# Patient Record
Sex: MALE | Race: OTHER | NOT HISPANIC OR LATINO
[De-identification: names, ages, dates, MRNs, and addresses within clinical notes are randomized per-mention and may not be internally consistent; named-entity substitution may affect disease eponyms.]

---

## 2018-07-03 ENCOUNTER — APPOINTMENT (OUTPATIENT)
Dept: INTERNAL MEDICINE | Facility: CLINIC | Age: 48
End: 2018-07-03
Payer: COMMERCIAL

## 2018-07-03 VITALS
DIASTOLIC BLOOD PRESSURE: 90 MMHG | OXYGEN SATURATION: 96 % | TEMPERATURE: 98.4 F | HEART RATE: 100 BPM | WEIGHT: 315 LBS | SYSTOLIC BLOOD PRESSURE: 120 MMHG

## 2018-07-03 DIAGNOSIS — Z82.49 FAMILY HISTORY OF ISCHEMIC HEART DISEASE AND OTHER DISEASES OF THE CIRCULATORY SYSTEM: ICD-10-CM

## 2018-07-03 PROCEDURE — 99386 PREV VISIT NEW AGE 40-64: CPT

## 2018-07-03 RX ORDER — MULTIVITAMIN
TABLET ORAL
Refills: 0 | Status: ACTIVE | COMMUNITY

## 2018-07-03 RX ORDER — COLD-HOT PACK
125 MCG EACH MISCELLANEOUS
Refills: 0 | Status: ACTIVE | COMMUNITY

## 2018-07-03 RX ORDER — OMEGA-3/DHA/EPA/FISH OIL 300-1000MG
1000 CAPSULE ORAL DAILY
Qty: 100 | Refills: 3 | Status: ACTIVE | COMMUNITY

## 2018-07-03 RX ORDER — UBIDECARENONE 200 MG
200 CAPSULE ORAL
Refills: 0 | Status: ACTIVE | COMMUNITY

## 2018-07-03 RX ORDER — LORAZEPAM 1 MG/1
1 TABLET ORAL
Qty: 30 | Refills: 0 | Status: ACTIVE | COMMUNITY
Start: 2018-07-03 | End: 1900-01-01

## 2018-07-03 RX ORDER — DULOXETINE HYDROCHLORIDE 60 MG/1
60 CAPSULE, DELAYED RELEASE ORAL TWICE DAILY
Refills: 0 | Status: ACTIVE | COMMUNITY

## 2018-07-03 RX ORDER — ASPIRIN 81 MG
81 TABLET, DELAYED RELEASE (ENTERIC COATED) ORAL
Refills: 0 | Status: ACTIVE | COMMUNITY

## 2018-07-03 NOTE — ASSESSMENT
[FreeTextEntry1] : \par 48 year old man with obesity, ESME, anxiety presents to establish care.\par \par 1. Obesity: check A1c, lipids, TSH; referred to weight management Dr. Banerjee/Dr. Goldstein for assistance, pt is motivated\par \par 2. Anxiety: renewed Lorazepam, checked iSTOP, no issues; only takes it as needed, 30 pills would last several months\par \par 3. ESME: cont f/u with sleep medicine; using CPAP\par \par 4. HCM: labs with physical today; praised pt's tobacco-free lifestyle; due for colonoscopy at 49 next year; prostate exam benign in office today, check PSA with labs

## 2018-07-03 NOTE — HISTORY OF PRESENT ILLNESS
[FreeTextEntry1] : establish care [de-identified] : Presents to establish care. Has hx anxiety, sees psychiatry and therapist and is compliant on medications. Feels well today. Also with hx asthma, on Dulera and proair as needed. Denies CP, SOB, wheezing today. He admits to struggling to lose weight, and is interested in meeting with weight management team for help.

## 2018-07-03 NOTE — PHYSICAL EXAM

## 2018-07-13 ENCOUNTER — MEDICATION RENEWAL (OUTPATIENT)
Age: 48
End: 2018-07-13

## 2018-07-13 LAB
ALBUMIN SERPL ELPH-MCNC: 4.3 G/DL
ALP BLD-CCNC: 107 U/L
ALT SERPL-CCNC: 40 U/L
ANION GAP SERPL CALC-SCNC: 15 MMOL/L
AST SERPL-CCNC: 32 U/L
BASOPHILS # BLD AUTO: 0.02 K/UL
BASOPHILS NFR BLD AUTO: 0.5 %
BILIRUB SERPL-MCNC: 0.6 MG/DL
BUN SERPL-MCNC: 13 MG/DL
CALCIUM SERPL-MCNC: 9.4 MG/DL
CHLORIDE SERPL-SCNC: 103 MMOL/L
CHOLEST SERPL-MCNC: 177 MG/DL
CHOLEST/HDLC SERPL: 6.3 RATIO
CO2 SERPL-SCNC: 25 MMOL/L
CREAT SERPL-MCNC: 0.82 MG/DL
EOSINOPHIL # BLD AUTO: 0.19 K/UL
EOSINOPHIL NFR BLD AUTO: 4.5 %
GLUCOSE SERPL-MCNC: 102 MG/DL
HBA1C MFR BLD HPLC: 5.4 %
HCT VFR BLD CALC: 42.4 %
HDLC SERPL-MCNC: 28 MG/DL
HGB BLD-MCNC: 15.3 G/DL
HIV1+2 AB SPEC QL IA.RAPID: NONREACTIVE
IMM GRANULOCYTES NFR BLD AUTO: 0.5 %
LDLC SERPL CALC-MCNC: NORMAL
LYMPHOCYTES # BLD AUTO: 1.39 K/UL
LYMPHOCYTES NFR BLD AUTO: 33.2 %
MAN DIFF?: NORMAL
MCHC RBC-ENTMCNC: 34.1 PG
MCHC RBC-ENTMCNC: 36.1 GM/DL
MCV RBC AUTO: 94.4 FL
MONOCYTES # BLD AUTO: 0.33 K/UL
MONOCYTES NFR BLD AUTO: 7.9 %
NEUTROPHILS # BLD AUTO: 2.24 K/UL
NEUTROPHILS NFR BLD AUTO: 53.4 %
PLATELET # BLD AUTO: 217 K/UL
POTASSIUM SERPL-SCNC: 4.4 MMOL/L
PROT SERPL-MCNC: 7.1 G/DL
PSA SERPL-MCNC: 0.72 NG/ML
RBC # BLD: 4.49 M/UL
RBC # FLD: 13 %
SODIUM SERPL-SCNC: 143 MMOL/L
TESTOST BND SERPL-MCNC: 6.2 PG/ML
TESTOST SERPL-MCNC: 290.7 NG/DL
TRIGL SERPL-MCNC: 423 MG/DL
TSH SERPL-ACNC: 3.74 UIU/ML
WBC # FLD AUTO: 4.19 K/UL

## 2018-07-17 ENCOUNTER — TRANSCRIPTION ENCOUNTER (OUTPATIENT)
Age: 48
End: 2018-07-17

## 2018-08-01 ENCOUNTER — APPOINTMENT (OUTPATIENT)
Dept: BARIATRICS/WEIGHT MGMT | Facility: CLINIC | Age: 48
End: 2018-08-01
Payer: COMMERCIAL

## 2018-08-01 VITALS
SYSTOLIC BLOOD PRESSURE: 124 MMHG | HEIGHT: 67.5 IN | BODY MASS INDEX: 48.86 KG/M2 | HEART RATE: 102 BPM | DIASTOLIC BLOOD PRESSURE: 90 MMHG | WEIGHT: 315 LBS | OXYGEN SATURATION: 94 %

## 2018-08-01 DIAGNOSIS — Z82.49 FAMILY HISTORY OF ISCHEMIC HEART DISEASE AND OTHER DISEASES OF THE CIRCULATORY SYSTEM: ICD-10-CM

## 2018-08-01 PROCEDURE — 99215 OFFICE O/P EST HI 40 MIN: CPT

## 2018-08-01 PROCEDURE — 99205 OFFICE O/P NEW HI 60 MIN: CPT

## 2018-08-22 ENCOUNTER — APPOINTMENT (OUTPATIENT)
Dept: BARIATRICS/WEIGHT MGMT | Facility: CLINIC | Age: 48
End: 2018-08-22

## 2018-10-03 ENCOUNTER — OUTPATIENT (OUTPATIENT)
Dept: OUTPATIENT SERVICES | Facility: HOSPITAL | Age: 48
LOS: 1 days | Discharge: STILL ADMITTED OR ADMINISTRATIVE | End: 2018-10-03

## 2018-10-04 DIAGNOSIS — F32.9 MAJOR DEPRESSIVE DISORDER, SINGLE EPISODE, UNSPECIFIED: ICD-10-CM

## 2018-11-13 ENCOUNTER — MEDICATION RENEWAL (OUTPATIENT)
Age: 48
End: 2018-11-13

## 2018-12-05 ENCOUNTER — APPOINTMENT (OUTPATIENT)
Dept: INTERNAL MEDICINE | Facility: CLINIC | Age: 48
End: 2018-12-05
Payer: COMMERCIAL

## 2018-12-05 VITALS
HEIGHT: 67.5 IN | OXYGEN SATURATION: 94 % | DIASTOLIC BLOOD PRESSURE: 104 MMHG | SYSTOLIC BLOOD PRESSURE: 160 MMHG | TEMPERATURE: 98.8 F | HEART RATE: 91 BPM | BODY MASS INDEX: 48.86 KG/M2 | WEIGHT: 315 LBS

## 2018-12-05 VITALS — DIASTOLIC BLOOD PRESSURE: 88 MMHG | SYSTOLIC BLOOD PRESSURE: 130 MMHG

## 2018-12-05 PROCEDURE — 90686 IIV4 VACC NO PRSV 0.5 ML IM: CPT

## 2018-12-05 PROCEDURE — G0008: CPT

## 2018-12-05 PROCEDURE — 99214 OFFICE O/P EST MOD 30 MIN: CPT | Mod: 25

## 2018-12-05 NOTE — ASSESSMENT
[FreeTextEntry1] : Pt 's BP was ?increased initially, seems better w thigh cuff but was diff to hear. He has machine at home and will check it over next few dd. Does cook w salt, will try to avoid.\par Wt loss blayne discussed, he will rejoin WW and start gradual exercising.\par Flu shot given.\par He will let  us know how is BP is doing.

## 2018-12-05 NOTE — HISTORY OF PRESENT ILLNESS
[FreeTextEntry1] : f/u and flu shot [de-identified] : 49 yo man w morbid obesity and asthma, anxiety/panic /depression.\lexie Sees Pulm Dr. Armstrong for his asthma, well-controlled\lexie saw Obesity med for his wt and didn't like the plan. He liked [employer] in the past and might be willing to resume. Hasn't been excising but plans to soon.\lexie Sees Joelle NUGENT and therapist and currently doing well- he says in Sept he d/cd his Cymbalta and was almost suicidal, went to [facility] ER but not admitted. Back on his meds and doing much better.\lexie

## 2019-07-10 ENCOUNTER — APPOINTMENT (OUTPATIENT)
Dept: INTERNAL MEDICINE | Facility: CLINIC | Age: 49
End: 2019-07-10
Payer: COMMERCIAL

## 2019-07-10 VITALS
HEIGHT: 67.5 IN | TEMPERATURE: 98.9 F | SYSTOLIC BLOOD PRESSURE: 152 MMHG | OXYGEN SATURATION: 97 % | DIASTOLIC BLOOD PRESSURE: 70 MMHG | BODY MASS INDEX: 48.86 KG/M2 | HEART RATE: 112 BPM | WEIGHT: 315 LBS

## 2019-07-10 DIAGNOSIS — R03.0 ELEVATED BLOOD-PRESSURE READING, W/OUT DIAGNOSIS OF HYPERTENSION: ICD-10-CM

## 2019-07-10 DIAGNOSIS — E66.9 OBESITY, UNSPECIFIED: ICD-10-CM

## 2019-07-10 PROCEDURE — G0447 BEHAVIOR COUNSEL OBESITY 15M: CPT

## 2019-07-10 PROCEDURE — 99396 PREV VISIT EST AGE 40-64: CPT

## 2019-07-10 PROCEDURE — G0444 DEPRESSION SCREEN ANNUAL: CPT

## 2019-07-10 PROCEDURE — 99215 OFFICE O/P EST HI 40 MIN: CPT | Mod: 25

## 2019-07-10 PROCEDURE — G0442 ANNUAL ALCOHOL SCREEN 15 MIN: CPT

## 2019-07-11 PROBLEM — R03.0 ELEVATED BP WITHOUT DIAGNOSIS OF HYPERTENSION: Status: RESOLVED | Noted: 2018-12-05 | Resolved: 2019-07-11

## 2019-07-11 LAB — HEMOCCULT STL QL IA: NEGATIVE

## 2019-07-11 NOTE — PHYSICAL EXAM
[No Acute Distress] : no acute distress [Well Nourished] : well nourished [Well Developed] : well developed [Well-Appearing] : well-appearing [Normal Sclera/Conjunctiva] : normal sclera/conjunctiva [PERRL] : pupils equal round and reactive to light [EOMI] : extraocular movements intact [Normal Outer Ear/Nose] : the outer ears and nose were normal in appearance [Normal Oropharynx] : the oropharynx was normal [No JVD] : no jugular venous distention [No Lymphadenopathy] : no lymphadenopathy [Supple] : supple [Thyroid Normal, No Nodules] : the thyroid was normal and there were no nodules present [No Respiratory Distress] : no respiratory distress  [No Accessory Muscle Use] : no accessory muscle use [Clear to Auscultation] : lungs were clear to auscultation bilaterally [Normal Rate] : normal rate  [Regular Rhythm] : with a regular rhythm [Normal S1, S2] : normal S1 and S2 [No Murmur] : no murmur heard [No Carotid Bruits] : no carotid bruits [No Abdominal Bruit] : a ~M bruit was not heard ~T in the abdomen [No Varicosities] : no varicosities [Pedal Pulses Present] : the pedal pulses are present [No Edema] : there was no peripheral edema [No Palpable Aorta] : no palpable aorta [Soft] : abdomen soft [No Extremity Clubbing/Cyanosis] : no extremity clubbing/cyanosis [Non-distended] : non-distended [Non Tender] : non-tender [No HSM] : no HSM [No Masses] : no abdominal mass palpated [Normal Bowel Sounds] : normal bowel sounds [Prostate Enlargement] : the prostate was not enlarged [Prostate Tenderness] : the prostate was not tender [No Prostate Nodules] : no prostate nodules [Normal Posterior Cervical Nodes] : no posterior cervical lymphadenopathy [Normal Anterior Cervical Nodes] : no anterior cervical lymphadenopathy [No CVA Tenderness] : no CVA  tenderness [No Spinal Tenderness] : no spinal tenderness [No Joint Swelling] : no joint swelling [Grossly Normal Strength/Tone] : grossly normal strength/tone [No Rash] : no rash [Coordination Grossly Intact] : coordination grossly intact [No Focal Deficits] : no focal deficits [Normal Gait] : normal gait [Deep Tendon Reflexes (DTR)] : deep tendon reflexes were 2+ and symmetric [Normal Affect] : the affect was normal [Normal Insight/Judgement] : insight and judgment were intact

## 2019-07-11 NOTE — ASSESSMENT
[FreeTextEntry1] : \par Preventive visit: pt is up to date with vaccines including Tdap; he is due for colon cancer screening, referral given; prostate cancer screening done today with GABINO and PSA testing; praised pt's tobacco-free lifestyle; encouraged use of smoke/CO detectors in the house and use of seatbelts when in vehicles\par \par Medical Issue 1: Anxiety: unstable and worsening with difficulty controlling symptoms off medication; this was complicated by visit to mental health hospital since self-d/c'ing medication which led to suicidal thoughts; the thoughts have since resolved and he restarted care with his psychiatrist along with medication; coordinated follow-up care with psych\par \par Medical Issue 2: Asthma: unstable and variably controlled with episodes of SOB occurring intermittently but usually associated with anxiety; prescribed inhalers and coordinated follow-up care with pulmonologist\par \par Medical Issue 3: Eczema: unstable with persistently dry skin that is itchy and bothersome; advised hydration with Aloe gel and alternating with steroid cream as anti-inflammatory agent; coordinated follow-up care with dermatology\par \par Medical Issue 4: Morbid obesity counselling: 15 mins, assessed BMI at 50 and above now in morbidly obese range; advised weight loss, exercise routine and diet; pt agreed to start exercise program for target weight loss 20 lbs; assisted patient with resources including nutrition referral as needed and arranged for follow-up to monitor weight loss progress over next several months\par \par Medical Issue 5: ESME: stable and controlled with CPAP machine; continue current plan of care and coordinated follow-up care with sleep medicine team for titration of CPAP settings\par \par Depression screen performed today, PHQ2=0\par \par Annual alcohol misuse screen, 15 mins, done; negative

## 2019-07-11 NOTE — HEALTH RISK ASSESSMENT
[Good] : ~his/her~  mood as  good [] : No [No] : No [No falls in past year] : Patient reported no falls in the past year [Change in mental status noted] : No change in mental status noted [Language] : denies difficulty with language [Behavior] : denies difficulty with behavior [Learning/Retaining New Information] : denies difficulty learning/retaining new information [Handling Complex Tasks] : denies difficulty handling complex tasks [Reasoning] : denies difficulty with reasoning [None] : None [Spatial Ability and Orientation] : denies difficulty with spatial ability and orientation [Alone] : lives alone [Employed] : employed [Single] : single [Sexually Active] : not sexually active [High Risk Behavior] : no high risk behavior [Feels Safe at Home] : Feels safe at home [Fully functional (bathing, dressing, toileting, transferring, walking, feeding)] : Fully functional (bathing, dressing, toileting, transferring, walking, feeding) [Reports changes in hearing] : Reports no changes in hearing [Fully functional (using the telephone, shopping, preparing meals, housekeeping, doing laundry, using] : Fully functional and needs no help or supervision to perform IADLs (using the telephone, shopping, preparing meals, housekeeping, doing laundry, using transportation, managing medications and managing finances) [Reports changes in vision] : Reports no changes in vision [Reports changes in dental health] : Reports no changes in dental health [Reports normal functional visual acuity (ie: able to read med bottle)] : Reports normal functional visual acuity [Carbon Monoxide Detector] : carbon monoxide detector [Smoke Detector] : smoke detector [Safety elements used in home] : safety elements used in home [Guns at Home] : no guns at home [Seat Belt] :  uses seat belt [Sunscreen] : uses sunscreen [Travel to Developing Areas] : does not  travel to developing areas [TB Exposure] : is not being exposed to tuberculosis [Caregiver Concerns] : does not have caregiver concerns [Reviewed no changes] : Reviewed no changes [AdvancecareDate] : 07/19

## 2019-07-11 NOTE — HISTORY OF PRESENT ILLNESS
[FreeTextEntry1] : Presents for preventive visit as well as concerns regarding anxiety, asthma, eczema, morbid obesity and ESME. [de-identified] : \par Preventive visit: pt is up to date with vaccines including Tdap; he is due for colon cancer screening; he is due for prostate cancer screening with GABINO and PSA testing; he does not smoke cigarettes and does not misuse alcohol; he feels safe at home and has smoke/CO detectors in the house; he wears seatbelts when in vehicles\par \par Medical Issue 1: Anxiety: unstable and worsening with difficulty controlling symptoms off medication; this was complicated by visit to mental health hospital since self-d/c'ing medication which led to suicidal thoughts; the thoughts have since resolved and he restarted care with his psychiatrist along with medication; he is looking for new psychologist and would like resources to help find one\par \par Medical Issue 2: Asthma: unstable and variably controlled with episodes of SOB occurring intermittently but usually associated with anxiety; he has inhalers which he uses as needed; he denies nighttime awakenings and chest pain; he follows with a pulmonologist as well\par \par Medical Issue 3: Eczema: unstable with persistently dry skin that is itchy and bothersome; he would like second opinion with new dermatologist and needs help with resources to set this up\par \par Medical Issue 4: Morbid obesity: unstable and worsening with increase in BMI and body weight; he has no diet and no exercise routine yet but he did join a gym and is motivated to start using it regularly; he denies chest pain with exertion and shortness of breath; he would like help with resources to achieve his weight loss goals\par \par Medical Issue 5: ESME: stable and controlled with CPAP machine; he lives alone so does not have anyone to tell him if he snores loudly or gasps for air overnight while sleeping; he reports no daytime somnolence and he wakes up feeling refreshed in the mornings

## 2019-07-12 LAB
ALBUMIN SERPL ELPH-MCNC: 4.5 G/DL
ALP BLD-CCNC: 106 U/L
ALT SERPL-CCNC: 41 U/L
ANION GAP SERPL CALC-SCNC: 14 MMOL/L
AST SERPL-CCNC: 34 U/L
BASOPHILS # BLD AUTO: 0.03 K/UL
BASOPHILS NFR BLD AUTO: 0.5 %
BILIRUB SERPL-MCNC: 0.5 MG/DL
BUN SERPL-MCNC: 15 MG/DL
CALCIUM SERPL-MCNC: 9.6 MG/DL
CHLORIDE SERPL-SCNC: 103 MMOL/L
CHOLEST SERPL-MCNC: 189 MG/DL
CHOLEST/HDLC SERPL: 7 RATIO
CO2 SERPL-SCNC: 27 MMOL/L
CREAT SERPL-MCNC: 0.98 MG/DL
EOSINOPHIL # BLD AUTO: 0.19 K/UL
EOSINOPHIL NFR BLD AUTO: 3.2 %
ESTIMATED AVERAGE GLUCOSE: 108 MG/DL
GLUCOSE SERPL-MCNC: 89 MG/DL
HBA1C MFR BLD HPLC: 5.4 %
HCT VFR BLD CALC: 50.9 %
HDLC SERPL-MCNC: 27 MG/DL
HGB BLD-MCNC: 16.4 G/DL
HIV1+2 AB SPEC QL IA.RAPID: NONREACTIVE
IMM GRANULOCYTES NFR BLD AUTO: 0.7 %
LDLC SERPL CALC-MCNC: NORMAL MG/DL
LYMPHOCYTES # BLD AUTO: 1.92 K/UL
LYMPHOCYTES NFR BLD AUTO: 32.1 %
MAN DIFF?: NORMAL
MCHC RBC-ENTMCNC: 31.1 PG
MCHC RBC-ENTMCNC: 32.2 GM/DL
MCV RBC AUTO: 96.6 FL
MONOCYTES # BLD AUTO: 0.6 K/UL
MONOCYTES NFR BLD AUTO: 10 %
NEUTROPHILS # BLD AUTO: 3.21 K/UL
NEUTROPHILS NFR BLD AUTO: 53.5 %
PLATELET # BLD AUTO: 226 K/UL
POTASSIUM SERPL-SCNC: 4 MMOL/L
PROT SERPL-MCNC: 7.3 G/DL
PSA SERPL-MCNC: 0.95 NG/ML
RBC # BLD: 5.27 M/UL
RBC # FLD: 12.9 %
SODIUM SERPL-SCNC: 144 MMOL/L
TRIGL SERPL-MCNC: 556 MG/DL
TSH SERPL-ACNC: 3.43 UIU/ML
WBC # FLD AUTO: 5.99 K/UL

## 2019-07-19 ENCOUNTER — TRANSCRIPTION ENCOUNTER (OUTPATIENT)
Age: 49
End: 2019-07-19

## 2019-07-22 ENCOUNTER — OTHER (OUTPATIENT)
Age: 49
End: 2019-07-22

## 2019-07-22 ENCOUNTER — APPOINTMENT (OUTPATIENT)
Dept: OTOLARYNGOLOGY | Facility: CLINIC | Age: 49
End: 2019-07-22
Payer: COMMERCIAL

## 2019-07-22 VITALS
DIASTOLIC BLOOD PRESSURE: 104 MMHG | HEART RATE: 89 BPM | WEIGHT: 315 LBS | BODY MASS INDEX: 47.74 KG/M2 | SYSTOLIC BLOOD PRESSURE: 166 MMHG | HEIGHT: 68 IN

## 2019-07-22 DIAGNOSIS — H90.3 SENSORINEURAL HEARING LOSS, BILATERAL: ICD-10-CM

## 2019-07-22 DIAGNOSIS — H69.83 OTHER SPECIFIED DISORDERS OF EUSTACHIAN TUBE, BILATERAL: ICD-10-CM

## 2019-07-22 PROCEDURE — 99204 OFFICE O/P NEW MOD 45 MIN: CPT | Mod: 25

## 2019-07-22 PROCEDURE — 31231 NASAL ENDOSCOPY DX: CPT

## 2019-07-22 PROCEDURE — 95004 PERQ TESTS W/ALRGNC XTRCS: CPT

## 2019-07-22 PROCEDURE — 92557 COMPREHENSIVE HEARING TEST: CPT

## 2019-07-22 PROCEDURE — 92567 TYMPANOMETRY: CPT

## 2019-07-22 RX ORDER — FEXOFENADINE HYDROCHLORIDE 180 MG/1
180 TABLET ORAL
Qty: 1 | Refills: 0 | Status: ACTIVE | COMMUNITY
Start: 2019-07-22 | End: 1900-01-01

## 2019-07-22 RX ORDER — FLUTICASONE PROPIONATE 50 UG/1
50 SPRAY, METERED NASAL DAILY
Qty: 1 | Refills: 2 | Status: ACTIVE | COMMUNITY
Start: 2019-07-22 | End: 1900-01-01

## 2019-07-22 NOTE — ASSESSMENT
[FreeTextEntry1] : Patient is  fifth greatest feels that his hearing is diminished used to been abandoned here for evaluation complains of some stuffiness on occasion endoscopically is no tumors masses or polyps being eustachian tube orifices audiogram was perfectly normal allergy testing was done we did put him on Flonase and Allegraafter coming positive to multiple and shins worst dust and CAT hair. Patient was educated will followup with usnasal spray for ongoing eustachian tube dysfunction reassured him that his hearing is perfectly normal no followup with us as needed.

## 2019-07-22 NOTE — REVIEW OF SYSTEMS
[Hearing Loss] : hearing loss [Snoring With Pauses] : snoring with pauses [Negative] : Endocrine [de-identified] : ear popping

## 2019-07-22 NOTE — CONSULT LETTER
[Dear  ___] : Dear  [unfilled], [Consult Letter:] : I had the pleasure of evaluating your patient, [unfilled]. [Please see my note below.] : Please see my note below. [Consult Closing:] : Thank you very much for allowing me to participate in the care of this patient.  If you have any questions, please do not hesitate to contact me. [Sincerely,] : Sincerely, [FreeTextEntry3] : Ajay Roe MD\par [facility]\par Otolaryngology and Facial Plastics\par Associated Professor, [employer]\par

## 2019-07-22 NOTE — HISTORY OF PRESENT ILLNESS
[de-identified] : Patient is having minor changes in hearing over the last year or so that is progressive and minor. He cannot tell if it is him or if it is children but when they speak he cannot hear them as well and it sounds like mumbling. He does admit to turning the volume up on the TV a lot. He also admits that he has been popping his ears a lot frequently by swallowing excessively. He shelton snot have any ear pain or issues with dizziness. He does not have any nasal congestion or runny nose. He does have ESME as well. 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

## 2019-07-29 ENCOUNTER — APPOINTMENT (OUTPATIENT)
Dept: GASTROENTEROLOGY | Facility: CLINIC | Age: 49
End: 2019-07-29
Payer: COMMERCIAL

## 2019-07-29 VITALS
SYSTOLIC BLOOD PRESSURE: 143 MMHG | DIASTOLIC BLOOD PRESSURE: 96 MMHG | HEIGHT: 68 IN | HEART RATE: 92 BPM | WEIGHT: 315 LBS | BODY MASS INDEX: 47.74 KG/M2

## 2019-07-29 DIAGNOSIS — F41.0 PANIC DISORDER [EPISODIC PAROXYSMAL ANXIETY]: ICD-10-CM

## 2019-07-29 DIAGNOSIS — Z78.9 OTHER SPECIFIED HEALTH STATUS: ICD-10-CM

## 2019-07-29 PROCEDURE — 99205 OFFICE O/P NEW HI 60 MIN: CPT

## 2019-07-29 PROCEDURE — 82274 ASSAY TEST FOR BLOOD FECAL: CPT | Mod: QW

## 2019-07-29 RX ORDER — LORAZEPAM 1 MG/1
1 TABLET ORAL
Refills: 0 | Status: DISCONTINUED | COMMUNITY
End: 2019-07-29

## 2019-07-29 NOTE — HISTORY OF PRESENT ILLNESS
[FreeTextEntry1] : This 49-year-old gentleman is referred for evaluation prior to scheduling a screening colonoscopy. He had a colonoscopy and upper endoscopy about 7 years ago during a workup for severe anxiety and panic attacks. Reportedly, both of the procedures were negative. He denies change in bowel habits, abdominal pain, blood in the stool, nausea, vomiting or heartburn. There is no family history of colon polyps or colon cancer. A maternal aunt had breast cancer. Recent blood testing reveals significant hypertriglyceridemia. He had a ventral hernia repair approximately 5 years ago. In , he had a tonsillectomy and deviated septum repair as treatment for his sleep apnea. He uses a CPAP machine. He has asthma, for which he takes Dulera and ProAir. He takes Cymbalta for anxiety, depression and panic attacks. He has eczema. He was treated for hypertension about 15 years ago, but only took medication for a short time. His mother  at 82 secondary to Alzheimer's disease. His father  at 78 secondary to a heart condition. He has 4 brothers, oral healthy. He is single. He works as a . She never smoked. He drinks alcohol rarely. He has allergies to weeds, trees, animal fur and shellfish.

## 2019-07-29 NOTE — ASSESSMENT
[FreeTextEntry1] : 1. Occult positive stool for blood-upper versus lower GI bleeding lesion-rule out polyp, malignancy, peptic ulcer, gastritis, hemorrhoids.\par 2. Mild obesity.\par 3. Anxiety, depression and history of panic attacks.\par 4. Asthma.\par 5. Sleep apnea.\par 6. Hypertriglyceridemia. \par 7. Status post ventral hernia repair.\par 8. Status post tonsillectomy and deviated septum repair.\par \par Plan:\par 1. The patient was advised to schedule an upper endoscopy and a colonoscopy. Both procedures should be done in the hospital as an outpatient. Both procedures, material risks, benefits and alternatives were discussed with the patient at length. Brochures given. Preadmission testing will be necessary.\par 2. The patient was advised to contact his PCP, Dr. Crenshaw, for a treatment of hypertriglyceridemia.\par 3. The patient was given the [facility] telephone line so that he can be connected with a nutritionist. He is a rare of the urgency of this appointment because of his morbid obesity.

## 2019-07-29 NOTE — PHYSICAL EXAM
[General Appearance - Well Developed] : well developed [General Appearance - Alert] : alert [General Appearance - In No Acute Distress] : in no acute distress [Sclera] : the sclera and conjunctiva were normal [Extraocular Movements] : extraocular movements were intact [PERRL With Normal Accommodation] : pupils were equal in size, round, and reactive to light [Retina] : no retinal hemorrhages, vessel changes or exudates seen on fundoscopic exam [Optic Disc Abnormality] : the optic disc were normal in size and color [Strabismus] : no strabismus was seen [Examination Of The Oral Cavity] : the lips and gums were normal [Outer Ear] : the ears and nose were normal in appearance [Both Tympanic Membranes Were Examined] : both tympanic membranes were normal [Oropharynx] : the oropharynx was normal [Nasal Cavity] : the nasal mucosa and septum were normal [Neck Appearance] : the appearance of the neck was normal [Thyroid Diffuse Enlargement] : the thyroid was not enlarged [Jugular Venous Distention Increased] : there was no jugular-venous distention [Neck Cervical Mass (___cm)] : no neck mass was observed [Thyroid Nodule] : there were no palpable thyroid nodules [Heart Rate And Rhythm] : heart rate was normal and rhythm regular [Lungs Percussion] : the lungs were normal to percussion [Auscultation Breath Sounds / Voice Sounds] : lungs were clear to auscultation bilaterally [Heart Sounds] : normal S1 and S2 [Heart Sounds Gallop] : no gallops [Murmurs] : no murmurs [Arterial Pulses Carotid] : carotid pulses were normal with no bruits [Heart Sounds Pericardial Friction Rub] : no pericardial rub [Abdominal Aorta] : the abdominal aorta was normal [Full Pulse] : the pedal pulses are present [Edema] : there was no peripheral edema [Veins - Varicosity Changes] : there were no varicosital changes [Bowel Sounds] : normal bowel sounds [Abdomen Soft] : soft [Abdomen Tenderness] : non-tender [Abdomen Mass (___ Cm)] : no abdominal mass palpated [Abdomen Hernia] : no hernia was discovered [Normal Sphincter Tone] : normal sphincter tone [No Rectal Mass] : no rectal mass [Occult Blood Positive] : stool positive for occult blood [Scrotum] : the scrotum was normal [Penis Abnormality] : the penis was normal [Testes Swelling] : the testicles were not swollen [Testes Mass (___cm)] : there were no testicular masses [Cervical Lymph Nodes Enlarged Posterior Bilaterally] : posterior cervical [FreeTextEntry1] : could not palpate the prostate [Cervical Lymph Nodes Enlarged Anterior Bilaterally] : anterior cervical [Supraclavicular Lymph Nodes Enlarged Bilaterally] : supraclavicular [Axillary Lymph Nodes Enlarged Bilaterally] : axillary [Inguinal Lymph Nodes Enlarged Bilaterally] : inguinal [Femoral Lymph Nodes Enlarged Bilaterally] : femoral [No Spinal Tenderness] : no spinal tenderness [No CVA Tenderness] : no ~M costovertebral angle tenderness [Nail Clubbing] : no clubbing  or cyanosis of the fingernails [Abnormal Walk] : normal gait [Musculoskeletal - Swelling] : no joint swelling seen [Motor Tone] : muscle strength and tone were normal [Involuntary Movements] : no involuntary movements were seen [Skin Color & Pigmentation] : normal skin color and pigmentation [Skin Turgor] : normal skin turgor [] : no rash [No Focal Deficits] : no focal deficits [Skin Lesions] : no skin lesions [Oriented To Time, Place, And Person] : oriented to person, place, and time [Impaired Insight] : insight and judgment were intact [Mood] : the mood was normal [Affect] : the affect was normal

## 2019-07-31 ENCOUNTER — TRANSCRIPTION ENCOUNTER (OUTPATIENT)
Age: 49
End: 2019-07-31

## 2019-08-01 ENCOUNTER — OUTPATIENT (OUTPATIENT)
Dept: OUTPATIENT SERVICES | Facility: HOSPITAL | Age: 49
LOS: 1 days | End: 2019-08-01
Payer: COMMERCIAL

## 2019-08-01 VITALS
WEIGHT: 315 LBS | HEIGHT: 70 IN | RESPIRATION RATE: 16 BRPM | DIASTOLIC BLOOD PRESSURE: 100 MMHG | TEMPERATURE: 98 F | SYSTOLIC BLOOD PRESSURE: 140 MMHG | HEART RATE: 84 BPM | OXYGEN SATURATION: 97 %

## 2019-08-01 DIAGNOSIS — Z98.890 OTHER SPECIFIED POSTPROCEDURAL STATES: Chronic | ICD-10-CM

## 2019-08-01 DIAGNOSIS — Z90.89 ACQUIRED ABSENCE OF OTHER ORGANS: Chronic | ICD-10-CM

## 2019-08-01 DIAGNOSIS — R19.5 OTHER FECAL ABNORMALITIES: ICD-10-CM

## 2019-08-01 LAB
ANION GAP SERPL CALC-SCNC: 13 MMO/L — SIGNIFICANT CHANGE UP (ref 7–14)
BUN SERPL-MCNC: 13 MG/DL — SIGNIFICANT CHANGE UP (ref 7–23)
CALCIUM SERPL-MCNC: 9.8 MG/DL — SIGNIFICANT CHANGE UP (ref 8.4–10.5)
CHLORIDE SERPL-SCNC: 101 MMOL/L — SIGNIFICANT CHANGE UP (ref 98–107)
CO2 SERPL-SCNC: 27 MMOL/L — SIGNIFICANT CHANGE UP (ref 22–31)
CREAT SERPL-MCNC: 0.86 MG/DL — SIGNIFICANT CHANGE UP (ref 0.5–1.3)
GLUCOSE SERPL-MCNC: 83 MG/DL — SIGNIFICANT CHANGE UP (ref 70–99)
HCT VFR BLD CALC: 48 % — SIGNIFICANT CHANGE UP (ref 39–50)
HGB BLD-MCNC: 16.5 G/DL — SIGNIFICANT CHANGE UP (ref 13–17)
MCHC RBC-ENTMCNC: 31.2 PG — SIGNIFICANT CHANGE UP (ref 27–34)
MCHC RBC-ENTMCNC: 34.4 % — SIGNIFICANT CHANGE UP (ref 32–36)
MCV RBC AUTO: 90.7 FL — SIGNIFICANT CHANGE UP (ref 80–100)
NRBC # FLD: 0 K/UL — SIGNIFICANT CHANGE UP (ref 0–0)
PLATELET # BLD AUTO: 229 K/UL — SIGNIFICANT CHANGE UP (ref 150–400)
PMV BLD: 10.5 FL — SIGNIFICANT CHANGE UP (ref 7–13)
POTASSIUM SERPL-MCNC: 4 MMOL/L — SIGNIFICANT CHANGE UP (ref 3.5–5.3)
POTASSIUM SERPL-SCNC: 4 MMOL/L — SIGNIFICANT CHANGE UP (ref 3.5–5.3)
RBC # BLD: 5.29 M/UL — SIGNIFICANT CHANGE UP (ref 4.2–5.8)
RBC # FLD: 12.2 % — SIGNIFICANT CHANGE UP (ref 10.3–14.5)
SODIUM SERPL-SCNC: 141 MMOL/L — SIGNIFICANT CHANGE UP (ref 135–145)
WBC # BLD: 5.67 K/UL — SIGNIFICANT CHANGE UP (ref 3.8–10.5)
WBC # FLD AUTO: 5.67 K/UL — SIGNIFICANT CHANGE UP (ref 3.8–10.5)

## 2019-08-01 PROCEDURE — 93010 ELECTROCARDIOGRAM REPORT: CPT

## 2019-08-01 NOTE — H&P PST ADULT - NEGATIVE ENMT SYMPTOMS
no hearing difficulty/no ear pain/no vertigo/no throat pain/no tinnitus/no sinus symptoms/no dysphagia

## 2019-08-01 NOTE — H&P PST ADULT - NEGATIVE NEUROLOGICAL SYMPTOMS
no transient paralysis/no weakness/no generalized seizures/no focal seizures/no paresthesias/no syncope

## 2019-08-01 NOTE — H&P PST ADULT - NSICDXPASTSURGICALHX_GEN_ALL_CORE_FT
PAST SURGICAL HISTORY:  H/O colonoscopy 2011    H/O hernia repair mesh 2014    History of tonsillectomy

## 2019-08-01 NOTE — H&P PST ADULT - HISTORY OF PRESENT ILLNESS
Pt is a 49 yr old male scheduled for EGD and Colonoscopy under Anesthesia with dr Gutierrez 8/5/19. Pt to have routine screening colonoscopy but due to BMI and Sleep apnea to be done in [facility]. Pt denies GI symptoms or pain but states MD noted positive hemocult stool test.

## 2019-08-01 NOTE — H&P PST ADULT - ANESTHESIA, PREVIOUS REACTION, PROFILE
Pt states he was told/not sure not sure/Pt states he was told after hernia surgery in 2014 at [facility] that they needed 3 anesthesiologists to intubate him - does not know when surgery was or with what surgeon . "Sometime in summer of 2014"

## 2019-08-01 NOTE — H&P PST ADULT - NSICDXPROBLEM_GEN_ALL_CORE_FT
PROBLEM DIAGNOSES  Problem: Other fecal abnormalities  Assessment and Plan: Pt scheduled for surgery and preop instructions including instructions for taking Famotidine on the day of surgery, given verbally and with use of  written materials, and patient confirming understanding of such instructions using  teach back method.  Pt had BP of 140/100 in PST and told to call PCP in am and have evaluated again - MC given 7/10/19 but new MC needed for BP - pt agreed.   OR Booking notified of Sleep apnea, BMI 56 and hx of difficult intubation

## 2019-08-05 ENCOUNTER — APPOINTMENT (OUTPATIENT)
Dept: GASTROENTEROLOGY | Facility: CLINIC | Age: 49
End: 2019-08-05

## 2019-08-05 ENCOUNTER — APPOINTMENT (OUTPATIENT)
Dept: GASTROENTEROLOGY | Facility: HOSPITAL | Age: 49
End: 2019-08-05

## 2019-08-06 ENCOUNTER — APPOINTMENT (OUTPATIENT)
Dept: INTERNAL MEDICINE | Facility: CLINIC | Age: 49
End: 2019-08-06
Payer: COMMERCIAL

## 2019-08-06 VITALS
TEMPERATURE: 98.8 F | HEIGHT: 68 IN | OXYGEN SATURATION: 96 % | SYSTOLIC BLOOD PRESSURE: 140 MMHG | HEART RATE: 104 BPM | BODY MASS INDEX: 47.74 KG/M2 | WEIGHT: 315 LBS | DIASTOLIC BLOOD PRESSURE: 80 MMHG

## 2019-08-06 PROBLEM — J45.909 UNSPECIFIED ASTHMA, UNCOMPLICATED: Chronic | Status: ACTIVE | Noted: 2019-08-01

## 2019-08-06 PROBLEM — F32.9 MAJOR DEPRESSIVE DISORDER, SINGLE EPISODE, UNSPECIFIED: Chronic | Status: ACTIVE | Noted: 2019-08-01

## 2019-08-06 PROBLEM — G47.30 SLEEP APNEA, UNSPECIFIED: Chronic | Status: ACTIVE | Noted: 2019-08-01

## 2019-08-06 PROBLEM — E66.9 OBESITY, UNSPECIFIED: Chronic | Status: ACTIVE | Noted: 2019-08-01

## 2019-08-06 PROCEDURE — 99215 OFFICE O/P EST HI 40 MIN: CPT

## 2019-08-06 PROCEDURE — G0447 BEHAVIOR COUNSEL OBESITY 15M: CPT

## 2019-08-07 ENCOUNTER — NON-APPOINTMENT (OUTPATIENT)
Age: 49
End: 2019-08-07

## 2019-08-07 ENCOUNTER — APPOINTMENT (OUTPATIENT)
Dept: OPHTHALMOLOGY | Facility: CLINIC | Age: 49
End: 2019-08-07
Payer: COMMERCIAL

## 2019-08-07 PROCEDURE — 92015 DETERMINE REFRACTIVE STATE: CPT

## 2019-08-07 PROCEDURE — 92004 COMPRE OPH EXAM NEW PT 1/>: CPT

## 2019-08-07 NOTE — ASSESSMENT
[FreeTextEntry1] : \par Medical Issue 1: Anxiety: unstable and worsening with difficulty controlling symptoms off medication; this was complicated by visit to mental health hospital since self-d/c'ing medication which led to suicidal thoughts; the thoughts have since resolved and he restarted care with his psychiatrist along with medication; coordinated follow-up care with psych\par \par Medical Issue 2: Asthma: unstable and variably controlled with episodes of SOB occurring intermittently but usually associated with anxiety; prescribed inhalers and coordinated follow-up care with pulmonologist\par \par Medical Issue 3: Eczema: unstable with persistently dry skin that is itchy and bothersome; advised hydration with Aloe gel and alternating with steroid cream as anti-inflammatory agent; coordinated follow-up care with dermatology\[facility] \par Medical Issue 4: Morbid obesity counselling: 15 mins, assessed BMI at 50 and above now in morbidly obese range; advised weight loss, exercise routine and diet; pt agreed to start exercise program for target weight loss 20 lbs; assisted patient with resources including nutrition referral as needed and arranged for follow-up to monitor weight loss progress over next several months\par \par Medical Issue 5: ESME: stable and controlled with CPAP machine; continue current plan of care and coordinated follow-up care with sleep medicine team for titration of CPAP settings\par \par Medical Issue 6: Hypertriglyceridemia: unstable and poorly controlled; rec starting Gemfibrozil over next 3 mos if trigs remain elevated despite his lifestyle change efforts

## 2019-08-07 NOTE — HISTORY OF PRESENT ILLNESS
[FreeTextEntry1] : Presents with concerns regarding anxiety, asthma, eczema, morbid obesity, ESME and hypertriglyceridemia. [de-identified] : \par Medical Issue 1: Anxiety: unstable and worsening with difficulty controlling symptoms off medication; this was complicated by visit to mental health hospital since self-d/c'ing medication which led to suicidal thoughts; the thoughts have since resolved and he restarted care with his psychiatrist along with medication; he is looking for new psychologist and would like resources to help find one\par \par Medical Issue 2: Asthma: unstable and variably controlled with episodes of SOB occurring intermittently but usually associated with anxiety; he has inhalers which he uses as needed; he denies nighttime awakenings and chest pain; he follows with a pulmonologist as well\par \par Medical Issue 3: Eczema: unstable with persistently dry skin that is itchy and bothersome; he would like second opinion with new dermatologist and needs help with resources to set this up\par \par Medical Issue 4: Morbid obesity: unstable and worsening with increase in BMI and body weight; he has no diet and no exercise routine yet but he did join a gym and is motivated to start using it regularly; he denies chest pain with exertion and shortness of breath; he would like help with resources to achieve his weight loss goals\par \par Medical Issue 5: ESME: stable and controlled with CPAP machine; he lives alone so does not have anyone to tell him if he snores loudly or gasps for air overnight while sleeping; he reports no daytime somnolence and he wakes up feeling refreshed in the mornings\par \par Medical Issue 6: Hypertriglyceridemia: unstable and poorly controlled; not currently on medication; he is trying to modify his diet and work on exercise routine for weight loss; he denies chest pains, palpitations and shortness of breath

## 2019-08-08 ENCOUNTER — OTHER (OUTPATIENT)
Age: 49
End: 2019-08-08

## 2019-09-17 ENCOUNTER — RX RENEWAL (OUTPATIENT)
Age: 49
End: 2019-09-17

## 2019-10-09 ENCOUNTER — APPOINTMENT (OUTPATIENT)
Dept: INTERNAL MEDICINE | Facility: CLINIC | Age: 49
End: 2019-10-09
Payer: COMMERCIAL

## 2019-10-09 VITALS
SYSTOLIC BLOOD PRESSURE: 140 MMHG | HEIGHT: 68 IN | DIASTOLIC BLOOD PRESSURE: 80 MMHG | BODY MASS INDEX: 47.74 KG/M2 | TEMPERATURE: 98.6 F | HEART RATE: 89 BPM | WEIGHT: 315 LBS | OXYGEN SATURATION: 98 %

## 2019-10-09 PROCEDURE — G0447 BEHAVIOR COUNSEL OBESITY 15M: CPT

## 2019-10-09 PROCEDURE — 90686 IIV4 VACC NO PRSV 0.5 ML IM: CPT

## 2019-10-09 PROCEDURE — G0008: CPT

## 2019-10-09 PROCEDURE — 99215 OFFICE O/P EST HI 40 MIN: CPT | Mod: 25

## 2019-10-09 NOTE — ASSESSMENT
[FreeTextEntry1] : \par Medical Issue 1: Anxiety: unstable and worsening with difficulty controlling symptoms off medication; this was complicated by visit to mental health hospital since self-d/c'ing medication which led to suicidal thoughts; the thoughts have since resolved and he restarted care with his psychiatrist along with medication; coordinated follow-up care with psych\par \par Medical Issue 2: Asthma: unstable and variably controlled with episodes of SOB occurring intermittently but usually associated with anxiety; prescribed inhalers and coordinated follow-up care with pulmonologist\par \par Medical Issue 3: Eczema: unstable with persistently dry skin that is itchy and bothersome; advised hydration with Aloe gel and alternating with steroid cream as anti-inflammatory agent; coordinated follow-up care with dermatology\[facility] \par Medical Issue 4: Morbid obesity counselling: 15 mins, assessed BMI at 50 and above now in morbidly obese range; advised weight loss, exercise routine and diet; pt agreed to start exercise program for target weight loss 20 lbs; assisted patient with resources including nutrition referral as needed and arranged for follow-up to monitor weight loss progress over next several months\par \par Medical Issue 5: ESME: stable and controlled with CPAP machine; continue current plan of care and coordinated follow-up care with sleep medicine team for titration of CPAP settings\par \par Medical Issue 6: Hypertriglyceridemia: unstable and poorly controlled; rec starting Gemfibrozil over next 3 mos if trigs remain elevated despite his lifestyle change efforts

## 2019-10-09 NOTE — HISTORY OF PRESENT ILLNESS
[FreeTextEntry1] : Presents with concerns regarding anxiety, asthma, eczema, morbid obesity, ESME and hypertriglyceridemia. [de-identified] : \par Medical Issue 1: Anxiety: unstable and worsening with difficulty controlling symptoms off medication; this was complicated by visit to mental health hospital since self-d/c'ing medication which led to suicidal thoughts; the thoughts have since resolved and he restarted care with his psychiatrist along with medication; he is looking for new psychologist and would like resources to help find one\par \par Medical Issue 2: Asthma: unstable and variably controlled with episodes of SOB occurring intermittently but usually associated with anxiety; he has inhalers which he uses as needed; he denies nighttime awakenings and chest pain; he follows with a pulmonologist as well\par \par Medical Issue 3: Eczema: unstable with persistently dry skin that is itchy and bothersome; he would like second opinion with new dermatologist and needs help with resources to set this up\par \par Medical Issue 4: Morbid obesity: unstable and worsening with increase in BMI and body weight; he has no diet and no exercise routine yet but he did join a gym and is motivated to start using it regularly; he denies chest pain with exertion and shortness of breath; he would like help with resources to achieve his weight loss goals\par \par Medical Issue 5: ESME: stable and controlled with CPAP machine; he lives alone so does not have anyone to tell him if he snores loudly or gasps for air overnight while sleeping; he reports no daytime somnolence and he wakes up feeling refreshed in the mornings\par \par Medical Issue 6: Hypertriglyceridemia: unstable and poorly controlled; not currently on medication; he is trying to modify his diet and work on exercise routine for weight loss; he denies chest pains, palpitations and shortness of breath

## 2019-10-09 NOTE — PHYSICAL EXAM
[No Acute Distress] : no acute distress [Well Developed] : well developed [Well Nourished] : well nourished [PERRL] : pupils equal round and reactive to light [Normal Sclera/Conjunctiva] : normal sclera/conjunctiva [Well-Appearing] : well-appearing [EOMI] : extraocular movements intact [Normal Outer Ear/Nose] : the outer ears and nose were normal in appearance [Normal Oropharynx] : the oropharynx was normal [No JVD] : no jugular venous distention [No Lymphadenopathy] : no lymphadenopathy [Thyroid Normal, No Nodules] : the thyroid was normal and there were no nodules present [Supple] : supple [No Respiratory Distress] : no respiratory distress  [No Accessory Muscle Use] : no accessory muscle use [Clear to Auscultation] : lungs were clear to auscultation bilaterally [Normal Rate] : normal rate  [Normal S1, S2] : normal S1 and S2 [Regular Rhythm] : with a regular rhythm [No Murmur] : no murmur heard [No Carotid Bruits] : no carotid bruits [No Abdominal Bruit] : a ~M bruit was not heard ~T in the abdomen [No Varicosities] : no varicosities [Pedal Pulses Present] : the pedal pulses are present [No Edema] : there was no peripheral edema [No Palpable Aorta] : no palpable aorta [No Extremity Clubbing/Cyanosis] : no extremity clubbing/cyanosis [Soft] : abdomen soft [Non-distended] : non-distended [No Masses] : no abdominal mass palpated [Non Tender] : non-tender [Normal Bowel Sounds] : normal bowel sounds [No HSM] : no HSM [Normal Posterior Cervical Nodes] : no posterior cervical lymphadenopathy [Normal Anterior Cervical Nodes] : no anterior cervical lymphadenopathy [No CVA Tenderness] : no CVA  tenderness [No Spinal Tenderness] : no spinal tenderness [No Joint Swelling] : no joint swelling [Grossly Normal Strength/Tone] : grossly normal strength/tone [No Rash] : no rash [Coordination Grossly Intact] : coordination grossly intact [Deep Tendon Reflexes (DTR)] : deep tendon reflexes were 2+ and symmetric [Normal Gait] : normal gait [No Focal Deficits] : no focal deficits [Normal Insight/Judgement] : insight and judgment were intact [Normal Affect] : the affect was normal

## 2019-10-15 ENCOUNTER — MEDICATION RENEWAL (OUTPATIENT)
Age: 49
End: 2019-10-15

## 2019-10-15 LAB
ALBUMIN SERPL ELPH-MCNC: 4.4 G/DL
ALP BLD-CCNC: 105 U/L
ALT SERPL-CCNC: 60 U/L
ANION GAP SERPL CALC-SCNC: 12 MMOL/L
AST SERPL-CCNC: 46 U/L
BILIRUB SERPL-MCNC: 0.9 MG/DL
BUN SERPL-MCNC: 13 MG/DL
CALCIUM SERPL-MCNC: 9.3 MG/DL
CHLORIDE SERPL-SCNC: 103 MMOL/L
CHOLEST SERPL-MCNC: 189 MG/DL
CHOLEST/HDLC SERPL: 7.6 RATIO
CO2 SERPL-SCNC: 25 MMOL/L
CREAT SERPL-MCNC: 0.77 MG/DL
ESTIMATED AVERAGE GLUCOSE: 111 MG/DL
GLUCOSE SERPL-MCNC: 89 MG/DL
HBA1C MFR BLD HPLC: 5.5 %
HDLC SERPL-MCNC: 25 MG/DL
LDLC SERPL CALC-MCNC: NORMAL MG/DL
POTASSIUM SERPL-SCNC: 3.9 MMOL/L
PROT SERPL-MCNC: 7.3 G/DL
SODIUM SERPL-SCNC: 140 MMOL/L
TRIGL SERPL-MCNC: 516 MG/DL

## 2019-12-18 ENCOUNTER — APPOINTMENT (OUTPATIENT)
Dept: INTERNAL MEDICINE | Facility: CLINIC | Age: 49
End: 2019-12-18
Payer: COMMERCIAL

## 2019-12-18 VITALS
WEIGHT: 315 LBS | DIASTOLIC BLOOD PRESSURE: 90 MMHG | SYSTOLIC BLOOD PRESSURE: 138 MMHG | BODY MASS INDEX: 47.74 KG/M2 | HEART RATE: 102 BPM | TEMPERATURE: 98.6 F | HEIGHT: 68 IN | OXYGEN SATURATION: 98 %

## 2019-12-18 PROCEDURE — G0447 BEHAVIOR COUNSEL OBESITY 15M: CPT

## 2019-12-18 PROCEDURE — 99215 OFFICE O/P EST HI 40 MIN: CPT

## 2019-12-19 NOTE — HISTORY OF PRESENT ILLNESS
[de-identified] : \par Medical Issue 1: Anxiety: unstable and worsening with difficulty controlling symptoms off medication; this was complicated by visit to mental health hospital since self-d/c'ing medication which led to suicidal thoughts; the thoughts have since resolved and he restarted care with his psychiatrist along with medication; he is looking for new psychologist and would like resources to help find one\par \par Medical Issue 2: Asthma: unstable and variably controlled with episodes of SOB occurring intermittently but usually associated with anxiety; he has inhalers which he uses as needed; he denies nighttime awakenings and chest pain; he follows with a pulmonologist as well\par \par Medical Issue 3: Eczema: unstable with persistently dry skin that is itchy and bothersome; he would like second opinion with new dermatologist and needs help with resources to set this up\par \par Medical Issue 4: Morbid obesity: unstable and worsening with increase in BMI and body weight; he has no diet and no exercise routine yet but he did join a gym and is motivated to start using it regularly; he denies chest pain with exertion and shortness of breath; he would like help with resources to achieve his weight loss goals\par \par Medical Issue 5: ESME: stable and controlled with CPAP machine; he lives alone so does not have anyone to tell him if he snores loudly or gasps for air overnight while sleeping; he reports no daytime somnolence and he wakes up feeling refreshed in the mornings\par \par Medical Issue 6: Hypertriglyceridemia: unstable and poorly controlled; not currently on medication; he is trying to modify his diet and work on exercise routine for weight loss; he denies chest pains, palpitations and shortness of breath [FreeTextEntry1] : Presents with concerns regarding anxiety, asthma, eczema, morbid obesity, ESME and hypertriglyceridemia.

## 2019-12-19 NOTE — ASSESSMENT
[FreeTextEntry1] : \par Medical Issue 1: Anxiety: unstable and worsening with difficulty controlling symptoms off medication; this was complicated by visit to mental health hospital since self-d/c'ing medication which led to suicidal thoughts; the thoughts have since resolved and he restarted care with his psychiatrist along with medication; coordinated follow-up care with psych\par \par Medical Issue 2: Asthma: unstable and variably controlled with episodes of SOB occurring intermittently but usually associated with anxiety; prescribed inhalers and coordinated follow-up care with pulmonologist\par \par Medical Issue 3: Eczema: unstable with persistently dry skin that is itchy and bothersome; advised hydration with Aloe gel and alternating with steroid cream as anti-inflammatory agent; coordinated follow-up care with dermatology\[facility] \par Medical Issue 4: Morbid obesity counselling: 15 mins, assessed BMI at 50 and above now in morbidly obese range; advised weight loss, exercise routine and diet; pt agreed to start exercise program for target weight loss 20 lbs; assisted patient with resources including nutrition referral as needed and arranged for follow-up to monitor weight loss progress over next several months\par \par Medical Issue 5: ESME: stable and controlled with CPAP machine; continue current plan of care and coordinated follow-up care with sleep medicine team for titration of CPAP settings\par \par Medical Issue 6: Hypertriglyceridemia: unstable and poorly controlled; rec starting Gemfibrozil over next 3 mos if trigs remain elevated despite his lifestyle change efforts

## 2019-12-20 ENCOUNTER — MEDICATION RENEWAL (OUTPATIENT)
Age: 49
End: 2019-12-20

## 2019-12-20 LAB
ALBUMIN SERPL ELPH-MCNC: 4.4 G/DL
ALP BLD-CCNC: 94 U/L
ALT SERPL-CCNC: 46 U/L
ANION GAP SERPL CALC-SCNC: 11 MMOL/L
AST SERPL-CCNC: 38 U/L
BILIRUB SERPL-MCNC: 0.5 MG/DL
BUN SERPL-MCNC: 11 MG/DL
CALCIUM SERPL-MCNC: 9.3 MG/DL
CHLORIDE SERPL-SCNC: 102 MMOL/L
CHOLEST SERPL-MCNC: 186 MG/DL
CHOLEST/HDLC SERPL: 6.2 RATIO
CO2 SERPL-SCNC: 28 MMOL/L
CREAT SERPL-MCNC: 0.76 MG/DL
GLUCOSE SERPL-MCNC: 88 MG/DL
HDLC SERPL-MCNC: 30 MG/DL
LDLC SERPL CALC-MCNC: 83 MG/DL
POTASSIUM SERPL-SCNC: 4.1 MMOL/L
PROT SERPL-MCNC: 7 G/DL
SODIUM SERPL-SCNC: 141 MMOL/L
TRIGL SERPL-MCNC: 365 MG/DL

## 2020-01-15 ENCOUNTER — APPOINTMENT (OUTPATIENT)
Dept: PEDIATRIC ALLERGY IMMUNOLOGY | Facility: CLINIC | Age: 50
End: 2020-01-15
Payer: COMMERCIAL

## 2020-01-15 VITALS
OXYGEN SATURATION: 96 % | HEART RATE: 88 BPM | SYSTOLIC BLOOD PRESSURE: 157 MMHG | DIASTOLIC BLOOD PRESSURE: 95 MMHG | HEIGHT: 67.99 IN | BODY MASS INDEX: 47.74 KG/M2 | WEIGHT: 315 LBS

## 2020-01-15 DIAGNOSIS — Z78.9 OTHER SPECIFIED HEALTH STATUS: ICD-10-CM

## 2020-01-15 PROCEDURE — 36415 COLL VENOUS BLD VENIPUNCTURE: CPT

## 2020-01-15 PROCEDURE — 95004 PERQ TESTS W/ALRGNC XTRCS: CPT

## 2020-01-15 PROCEDURE — 99204 OFFICE O/P NEW MOD 45 MIN: CPT | Mod: 25

## 2020-01-15 RX ORDER — EPINEPHRINE 0.3 MG/.3ML
0.3 INJECTION INTRAMUSCULAR
Qty: 1 | Refills: 1 | Status: ACTIVE | COMMUNITY
Start: 2020-01-15 | End: 1900-01-01

## 2020-01-16 NOTE — HISTORY OF PRESENT ILLNESS
[de-identified] : 49 year old male with asthma and history of reactions in the past to shellfish who comes in for an initial evaluation.  The patient was seen by ENT. \par Mr. Astorga has had asthma since childhood but has never had asthma attacks that led to Emergency Room visits, but does remember getting allergen IT when he was very young.  The patient has Pulmonology- Dr. Acevedo, and PFT are done there.  Dr. Crenshaw also cares for patient's asthma.\par When in his 20s, patient had reactions of throat closing after eating crab on one occasion and lobster pizza on another occasion.  There was no vomiting but does not remember if he was pruritic but symptoms were treated with Benadryl.  The patient has avoided shellfish since that time.  There are no other specific food associated reactions. The patient feels that his asthma sometimes acts up after eating milk and milk products. \par There is associated eczema that developed as a teenager, but this is under better control now but now on antecubital fossae are sometime flared. Patient uses topical tacrolimus.  When seen by ENT, patient was stared on daily Flonase.- pollen, weeds, cat, etc. were tested positive.

## 2020-01-16 NOTE — SOCIAL HISTORY
[House] : [unfilled] lives in a house  [None] : none [FreeTextEntry2] : teacher [Bedroom] : not in the bedroom [Smokers in Household] : there are no smokers in the home

## 2020-01-16 NOTE — PHYSICAL EXAM
[Alert] : alert [Well Nourished] : well nourished [Healthy Appearance] : healthy appearance [No Acute Distress] : no acute distress [Well Developed] : well developed [Normal Pupil & Iris Size/Symmetry] : normal pupil and iris size and symmetry [No Discharge] : no discharge [No Photophobia] : no photophobia [Sclera Not Icteric] : sclera not icteric [Normal Lips/Tongue] : the lips and tongue were normal [Normal Outer Ear/Nose] : the ears and nose were normal in appearance [No Thrush] : no thrush [Boggy Nasal Turbinates] : boggy and/or pale nasal turbinates [Posterior Pharyngeal Cobblestoning] : posterior pharyngeal cobblestoning [Supple] : the neck was supple [Normal Rate and Effort] : normal respiratory rhythm and effort [Normal Palpation] : palpation of the chest revealed no abnormalities [No Crackles] : no crackles [No Retractions] : no retractions [Bilateral Audible Breath Sounds] : bilateral audible breath sounds [Normal Rate] : heart rate was normal  [Normal S1, S2] : normal S1 and S2 [No murmur] : no murmur [Regular Rhythm] : with a regular rhythm [Normal Cervical Lymph Nodes] : cervical [Skin Intact] : skin intact  [No Rash] : no rash [No Skin Lesions] : no skin lesions [Eczematous Patches] : eczematous patches present [No clubbing] : no clubbing [No Edema] : no edema [No Cyanosis] : no cyanosis [Normal Mood] : mood was normal [Normal Affect] : affect was normal [Alert, Awake, Oriented as Age-Appropriate] : alert, awake, oriented as age appropriate [de-identified] : Obese [de-identified] : Obese [de-identified] : left antecubital fossa with rough dry eczematous patch

## 2020-01-16 NOTE — CONSULT LETTER
[Dear  ___] : Dear  [unfilled], [Please see my note below.] : Please see my note below. [Consult Closing:] : Thank you very much for allowing me to participate in the care of this patient.  If you have any questions, please do not hesitate to contact me. [Consult Letter:] : I had the pleasure of evaluating your patient, [unfilled]. [Sincerely,] : Sincerely, [FreeTextEntry2] : Ricardo Crenshaw MD [FreeTextEntry3] : Elizabeth Sawant MD, FAAAAI, FACONEYDAI\par Associate , \par Assistant Fellowship Training ,\par Director, [facility] and [facility]\par Division of Allergy and Immunology\par [facility]\par [facility]\par , Pediatrics and Medicine\par [facility] [employer] [facility]\par [address], [address]\[place] [address]\par [phone]\par

## 2020-02-26 ENCOUNTER — APPOINTMENT (OUTPATIENT)
Dept: INTERNAL MEDICINE | Facility: CLINIC | Age: 50
End: 2020-02-26
Payer: COMMERCIAL

## 2020-02-26 ENCOUNTER — LABORATORY RESULT (OUTPATIENT)
Age: 50
End: 2020-02-26

## 2020-02-26 VITALS
SYSTOLIC BLOOD PRESSURE: 144 MMHG | OXYGEN SATURATION: 94 % | DIASTOLIC BLOOD PRESSURE: 91 MMHG | BODY MASS INDEX: 47.74 KG/M2 | HEIGHT: 67.99 IN | HEART RATE: 86 BPM | TEMPERATURE: 98.8 F | WEIGHT: 315 LBS

## 2020-02-26 DIAGNOSIS — Z91.013 ALLERGY TO SEAFOOD: ICD-10-CM

## 2020-02-26 PROCEDURE — 99215 OFFICE O/P EST HI 40 MIN: CPT | Mod: 25

## 2020-02-26 PROCEDURE — G0447 BEHAVIOR COUNSEL OBESITY 15M: CPT

## 2020-02-26 PROCEDURE — G0444 DEPRESSION SCREEN ANNUAL: CPT

## 2020-02-26 NOTE — PHYSICAL EXAM
[No Acute Distress] : no acute distress [Well Nourished] : well nourished [Well Developed] : well developed [Well-Appearing] : well-appearing [PERRL] : pupils equal round and reactive to light [EOMI] : extraocular movements intact [Normal Sclera/Conjunctiva] : normal sclera/conjunctiva [Normal Outer Ear/Nose] : the outer ears and nose were normal in appearance [Normal Oropharynx] : the oropharynx was normal [No JVD] : no jugular venous distention [Supple] : supple [No Lymphadenopathy] : no lymphadenopathy [Thyroid Normal, No Nodules] : the thyroid was normal and there were no nodules present [No Respiratory Distress] : no respiratory distress  [No Accessory Muscle Use] : no accessory muscle use [Clear to Auscultation] : lungs were clear to auscultation bilaterally [Normal Rate] : normal rate  [Regular Rhythm] : with a regular rhythm [Normal S1, S2] : normal S1 and S2 [No Murmur] : no murmur heard [No Carotid Bruits] : no carotid bruits [No Abdominal Bruit] : a ~M bruit was not heard ~T in the abdomen [No Varicosities] : no varicosities [Pedal Pulses Present] : the pedal pulses are present [No Edema] : there was no peripheral edema [No Palpable Aorta] : no palpable aorta [No Extremity Clubbing/Cyanosis] : no extremity clubbing/cyanosis [Non Tender] : non-tender [Non-distended] : non-distended [Soft] : abdomen soft [No Masses] : no abdominal mass palpated [No HSM] : no HSM [Normal Bowel Sounds] : normal bowel sounds [Normal Posterior Cervical Nodes] : no posterior cervical lymphadenopathy [Normal Anterior Cervical Nodes] : no anterior cervical lymphadenopathy [No CVA Tenderness] : no CVA  tenderness [No Spinal Tenderness] : no spinal tenderness [Grossly Normal Strength/Tone] : grossly normal strength/tone [No Joint Swelling] : no joint swelling [No Rash] : no rash [Coordination Grossly Intact] : coordination grossly intact [No Focal Deficits] : no focal deficits [Normal Gait] : normal gait [Normal Affect] : the affect was normal [Deep Tendon Reflexes (DTR)] : deep tendon reflexes were 2+ and symmetric [Normal Insight/Judgement] : insight and judgment were intact

## 2020-02-26 NOTE — ASSESSMENT
[FreeTextEntry1] : \par Medical Issue 1: Anxiety: unstable and worsening with difficulty controlling symptoms off medication; this was complicated by visit to mental health hospital since self-d/c'ing medication which led to suicidal thoughts; the thoughts have since resolved and he restarted care with his psychiatrist along with medication; coordinated follow-up care with psych\par \par Medical Issue 2: Asthma: unstable and variably controlled with episodes of SOB occurring intermittently but usually associated with anxiety; prescribed inhalers and coordinated follow-up care with pulmonologist\par \par Medical Issue 3: Eczema: unstable with persistently dry skin that is itchy and bothersome; advised hydration with Aloe gel and alternating with steroid cream as anti-inflammatory agent; coordinated follow-up care with dermatology\par \par Medical Issue 4: Morbid obesity counselling: 15 mins, assessed BMI at 50 and above now in morbidly obese range; advised weight loss, exercise routine and diet; pt agreed to start exercise program for target weight loss 20 lbs; assisted patient with resources including nutrition referral as needed and arranged for follow-up to monitor weight loss progress over next several months\par \par Medical Issue 5: ESME: stable and controlled with CPAP machine; continue current plan of care and coordinated follow-up care with sleep medicine team for titration of CPAP settings\par \par Medical Issue 6: Hypertriglyceridemia: unstable and poorly controlled; rec starting Gemfibrozil over next 3 mos if trigs remain elevated despite his lifestyle change efforts\par \par Depression screen performed today, PHQ2=0

## 2020-02-26 NOTE — HISTORY OF PRESENT ILLNESS
[FreeTextEntry1] : Presents with concerns regarding anxiety, asthma, eczema, morbid obesity, ESME and hypertriglyceridemia. [de-identified] : \par Medical Issue 1: Anxiety: unstable and worsening with difficulty controlling symptoms off medication; this was complicated by visit to mental health hospital since self-d/c'ing medication which led to suicidal thoughts; the thoughts have since resolved and he restarted care with his psychiatrist along with medication; he is looking for new psychologist and would like resources to help find one\par \par Medical Issue 2: Asthma: unstable and variably controlled with episodes of SOB occurring intermittently but usually associated with anxiety; he has inhalers which he uses as needed; he denies nighttime awakenings and chest pain; he follows with a pulmonologist as well\par \par Medical Issue 3: Eczema: unstable with persistently dry skin that is itchy and bothersome; he would like second opinion with new dermatologist and needs help with resources to set this up\par \par Medical Issue 4: Morbid obesity: unstable and worsening with increase in BMI and body weight; he has no diet and no exercise routine yet but he did join a gym and is motivated to start using it regularly; he denies chest pain with exertion and shortness of breath; he would like help with resources to achieve his weight loss goals\par \par Medical Issue 5: ESME: stable and controlled with CPAP machine; he lives alone so does not have anyone to tell him if he snores loudly or gasps for air overnight while sleeping; he reports no daytime somnolence and he wakes up feeling refreshed in the mornings\par \par Medical Issue 6: Hypertriglyceridemia: unstable and poorly controlled; not currently on medication; he is trying to modify his diet and work on exercise routine for weight loss; he denies chest pains, palpitations and shortness of breath

## 2020-02-28 LAB
ALBUMIN SERPL ELPH-MCNC: 4.8 G/DL
ALP BLD-CCNC: 101 U/L
ALT SERPL-CCNC: 54 U/L
ANION GAP SERPL CALC-SCNC: 17 MMOL/L
AST SERPL-CCNC: 43 U/L
BASOPHILS # BLD AUTO: 0.06 K/UL
BASOPHILS NFR BLD AUTO: 1 %
BILIRUB SERPL-MCNC: 0.9 MG/DL
BLUE MUSSEL IGE QN: 0.59 KUA/L
BUN SERPL-MCNC: 13 MG/DL
CALCIUM SERPL-MCNC: 9.6 MG/DL
CHLORIDE SERPL-SCNC: 101 MMOL/L
CHOLEST SERPL-MCNC: 201 MG/DL
CHOLEST/HDLC SERPL: 6.1 RATIO
CLAM IGE QN: 1.79 KUA/L
CO2 SERPL-SCNC: 23 MMOL/L
CRAB IGE QN: 3.98 KUA/L
CREAT SERPL-MCNC: 0.89 MG/DL
DEPRECATED BLUE MUSSEL IGE RAST QL: 1
DEPRECATED CLAM IGE RAST QL: 2
DEPRECATED CRAB IGE RAST QL: 3
DEPRECATED LOBSTER IGE RAST QL: 3
DEPRECATED OYSTER IGE RAST QL: 2
DEPRECATED SCALLOP IGE RAST QL: 4.15 KUA/L
DEPRECATED SHRIMP IGE RAST QL: 3
EOSINOPHIL # BLD AUTO: 0.21 K/UL
EOSINOPHIL NFR BLD AUTO: 3.6 %
ESTIMATED AVERAGE GLUCOSE: 105 MG/DL
GLUCOSE SERPL-MCNC: 90 MG/DL
HBA1C MFR BLD HPLC: 5.3 %
HCT VFR BLD CALC: 49 %
HDLC SERPL-MCNC: 33 MG/DL
HGB BLD-MCNC: 16.1 G/DL
IMM GRANULOCYTES NFR BLD AUTO: 0.3 %
LDLC SERPL CALC-MCNC: 105 MG/DL
LOBSTER IGE QN: 4.42 KUA/L
LYMPHOCYTES # BLD AUTO: 1.93 K/UL
LYMPHOCYTES NFR BLD AUTO: 33.4 %
MAN DIFF?: NORMAL
MCHC RBC-ENTMCNC: 31.8 PG
MCHC RBC-ENTMCNC: 32.9 GM/DL
MCV RBC AUTO: 96.8 FL
MONOCYTES # BLD AUTO: 0.53 K/UL
MONOCYTES NFR BLD AUTO: 9.2 %
NEUTROPHILS # BLD AUTO: 3.02 K/UL
NEUTROPHILS NFR BLD AUTO: 52.5 %
OYSTER IGE QN: 0.91 KUA/L
PLATELET # BLD AUTO: 235 K/UL
POTASSIUM SERPL-SCNC: 4.1 MMOL/L
PROT SERPL-MCNC: 7.7 G/DL
RBC # BLD: 5.06 M/UL
RBC # FLD: 13.2 %
SCALLOP IGE QN: 3
SCALLOP IGE QN: 6.94 KUA/L
SODIUM SERPL-SCNC: 141 MMOL/L
TRIGL SERPL-MCNC: 315 MG/DL
TSH SERPL-ACNC: 3.59 UIU/ML
WBC # FLD AUTO: 5.77 K/UL

## 2020-08-05 ENCOUNTER — APPOINTMENT (OUTPATIENT)
Dept: INTERNAL MEDICINE | Facility: CLINIC | Age: 50
End: 2020-08-05
Payer: COMMERCIAL

## 2020-08-05 VITALS
TEMPERATURE: 93.7 F | DIASTOLIC BLOOD PRESSURE: 82 MMHG | SYSTOLIC BLOOD PRESSURE: 188 MMHG | OXYGEN SATURATION: 96 % | HEIGHT: 67.99 IN | HEART RATE: 97 BPM

## 2020-08-05 VITALS — BODY MASS INDEX: 56.28 KG/M2 | WEIGHT: 315 LBS

## 2020-08-05 LAB — HEMOCCULT STL QL IA: NEGATIVE

## 2020-08-05 PROCEDURE — 99396 PREV VISIT EST AGE 40-64: CPT

## 2020-08-05 PROCEDURE — 99214 OFFICE O/P EST MOD 30 MIN: CPT | Mod: 25

## 2020-08-05 PROCEDURE — G0447 BEHAVIOR COUNSEL OBESITY 15M: CPT

## 2020-08-05 PROCEDURE — G0442 ANNUAL ALCOHOL SCREEN 15 MIN: CPT

## 2020-08-05 NOTE — HISTORY OF PRESENT ILLNESS
[FreeTextEntry1] : Presents for preventive visit as well as concerns regarding anxiety, asthma, eczema, morbid obesity and ESME. [de-identified] : \par Preventive visit: pt is up to date with vaccines including Tdap; he is up to date with colon cancer screening; he is due for prostate cancer screening with PSA testing; he does not smoke cigarettes and does not misuse alcohol; he feels safe at home and has smoke/CO detectors in the house; he wears seatbelts when in vehicles\par \par Medical Issue 1: Anxiety: unstable and worsening with difficulty controlling symptoms off medication; this was complicated by visit to mental health hospital since self-d/c'ing medication which led to suicidal thoughts; the thoughts have since resolved and he restarted care with his psychiatrist along with medication; he is looking for new psychologist and would like resources to help find one\par \par Medical Issue 2: Asthma: unstable and variably controlled with episodes of SOB occurring intermittently but usually associated with anxiety; he has inhalers which he uses as needed; he denies nighttime awakenings and chest pain; he follows with a pulmonologist as well\par \par Medical Issue 3: Eczema: unstable with persistently dry skin that is itchy and bothersome; he would like second opinion with new dermatologist and needs help with resources to set this up\par \par Medical Issue 4: Morbid obesity: unstable and worsening with increase in BMI and body weight; he has no diet and no exercise routine yet but he did join a gym and is motivated to start using it regularly; he denies chest pain with exertion and shortness of breath; he would like help with resources to achieve his weight loss goals\par \par Medical Issue 5: ESME: stable and controlled with CPAP machine; he lives alone so does not have anyone to tell him if he snores loudly or gasps for air overnight while sleeping; he reports no daytime somnolence and he wakes up feeling refreshed in the mornings

## 2020-08-05 NOTE — HEALTH RISK ASSESSMENT
[] : No [Good] : ~his/her~  mood as  good [No falls in past year] : Patient reported no falls in the past year [No] : No [Change in mental status noted] : No change in mental status noted [Language] : denies difficulty with language [Learning/Retaining New Information] : denies difficulty learning/retaining new information [Behavior] : denies difficulty with behavior [Reasoning] : denies difficulty with reasoning [Handling Complex Tasks] : denies difficulty handling complex tasks [Spatial Ability and Orientation] : denies difficulty with spatial ability and orientation [Alone] : lives alone [None] : None [Sexually Active] : not sexually active [Employed] : employed [Single] : single [Feels Safe at Home] : Feels safe at home [High Risk Behavior] : no high risk behavior [Fully functional (using the telephone, shopping, preparing meals, housekeeping, doing laundry, using] : Fully functional and needs no help or supervision to perform IADLs (using the telephone, shopping, preparing meals, housekeeping, doing laundry, using transportation, managing medications and managing finances) [Fully functional (bathing, dressing, toileting, transferring, walking, feeding)] : Fully functional (bathing, dressing, toileting, transferring, walking, feeding) [Reports changes in vision] : Reports no changes in vision [Reports changes in hearing] : Reports no changes in hearing [Reports changes in dental health] : Reports no changes in dental health [Reports normal functional visual acuity (ie: able to read med bottle)] : Reports normal functional visual acuity [Carbon Monoxide Detector] : carbon monoxide detector [Smoke Detector] : smoke detector [Safety elements used in home] : safety elements used in home [Guns at Home] : no guns at home [Seat Belt] :  uses seat belt [Sunscreen] : uses sunscreen [TB Exposure] : is not being exposed to tuberculosis [Travel to Developing Areas] : does not  travel to developing areas [Caregiver Concerns] : does not have caregiver concerns [Reviewed no changes] : Reviewed no changes [AdvancecareDate] : 08/20

## 2020-08-05 NOTE — ASSESSMENT
[FreeTextEntry1] : \par Preventive visit: pt is up to date with vaccines including Tdap; he is up to date with colon cancer screening; prostate cancer screening done today with PSA testing; praised pt's tobacco-free lifestyle; encouraged use of smoke/CO detectors in the house and use of seatbelts when in vehicles\par \par Medical Issue 1: Anxiety: unstable and worsening with difficulty controlling symptoms off medication; this was complicated by visit to mental health hospital since self-d/c'ing medication which led to suicidal thoughts; the thoughts have since resolved and he restarted care with his psychiatrist along with medication; coordinated follow-up care with psych\par \par Medical Issue 2: Asthma: unstable and variably controlled with episodes of SOB occurring intermittently but usually associated with anxiety; prescribed inhalers and coordinated follow-up care with pulmonologist\par \par Medical Issue 3: Eczema: unstable with persistently dry skin that is itchy and bothersome; advised hydration with Aloe gel and alternating with steroid cream as anti-inflammatory agent; coordinated follow-up care with dermatology\[facility] \par Medical Issue 4: Morbid obesity counselling: 15 mins, assessed BMI at 50 and above now in morbidly obese range; advised weight loss, exercise routine and diet; pt agreed to start exercise program for target weight loss 20 lbs; assisted patient with resources including nutrition referral as needed and arranged for follow-up to monitor weight loss progress over next several months\par \par Medical Issue 5: ESME: stable and controlled with CPAP machine; continue current plan of care and coordinated follow-up care with sleep medicine team for titration of CPAP settings\par \par Annual alcohol misuse screen, 15 mins, done; negative

## 2020-08-05 NOTE — PHYSICAL EXAM
[Well Nourished] : well nourished [No Acute Distress] : no acute distress [Well-Appearing] : well-appearing [Normal Sclera/Conjunctiva] : normal sclera/conjunctiva [Well Developed] : well developed [EOMI] : extraocular movements intact [PERRL] : pupils equal round and reactive to light [No JVD] : no jugular venous distention [No Lymphadenopathy] : no lymphadenopathy [Supple] : supple [No Accessory Muscle Use] : no accessory muscle use [No Respiratory Distress] : no respiratory distress  [Clear to Auscultation] : lungs were clear to auscultation bilaterally [Normal Rate] : normal rate  [Regular Rhythm] : with a regular rhythm [No Murmur] : no murmur heard [Normal S1, S2] : normal S1 and S2 [No Abdominal Bruit] : a ~M bruit was not heard ~T in the abdomen [No Carotid Bruits] : no carotid bruits [No Edema] : there was no peripheral edema [No Varicosities] : no varicosities [Pedal Pulses Present] : the pedal pulses are present [No Extremity Clubbing/Cyanosis] : no extremity clubbing/cyanosis [No Palpable Aorta] : no palpable aorta [Soft] : abdomen soft [Non Tender] : non-tender [Non-distended] : non-distended [Normal Bowel Sounds] : normal bowel sounds [No Masses] : no abdominal mass palpated [No HSM] : no HSM [Normal Posterior Cervical Nodes] : no posterior cervical lymphadenopathy [Normal Anterior Cervical Nodes] : no anterior cervical lymphadenopathy [No CVA Tenderness] : no CVA  tenderness [No Joint Swelling] : no joint swelling [No Spinal Tenderness] : no spinal tenderness [No Rash] : no rash [Grossly Normal Strength/Tone] : grossly normal strength/tone [Coordination Grossly Intact] : coordination grossly intact [Normal Gait] : normal gait [No Focal Deficits] : no focal deficits [Normal Affect] : the affect was normal [Deep Tendon Reflexes (DTR)] : deep tendon reflexes were 2+ and symmetric [Normal Insight/Judgement] : insight and judgment were intact

## 2020-08-07 LAB
ALBUMIN SERPL ELPH-MCNC: 4.8 G/DL
ALP BLD-CCNC: 93 U/L
ALT SERPL-CCNC: 53 U/L
ANION GAP SERPL CALC-SCNC: 15 MMOL/L
AST SERPL-CCNC: 44 U/L
BASOPHILS # BLD AUTO: 0.03 K/UL
BASOPHILS NFR BLD AUTO: 0.6 %
BILIRUB SERPL-MCNC: 0.6 MG/DL
BUN SERPL-MCNC: 12 MG/DL
CALCIUM SERPL-MCNC: 9.8 MG/DL
CHLORIDE SERPL-SCNC: 104 MMOL/L
CHOLEST SERPL-MCNC: 210 MG/DL
CHOLEST/HDLC SERPL: 6.4 RATIO
CO2 SERPL-SCNC: 24 MMOL/L
CREAT SERPL-MCNC: 0.94 MG/DL
EOSINOPHIL # BLD AUTO: 0.23 K/UL
EOSINOPHIL NFR BLD AUTO: 4.4 %
ESTIMATED AVERAGE GLUCOSE: 108 MG/DL
GLUCOSE SERPL-MCNC: 89 MG/DL
HBA1C MFR BLD HPLC: 5.4 %
HCT VFR BLD CALC: 52.1 %
HDLC SERPL-MCNC: 33 MG/DL
HGB BLD-MCNC: 16.5 G/DL
HIV1+2 AB SPEC QL IA.RAPID: NONREACTIVE
IMM GRANULOCYTES NFR BLD AUTO: 0.6 %
LDLC SERPL CALC-MCNC: 115 MG/DL
LYMPHOCYTES # BLD AUTO: 1.82 K/UL
LYMPHOCYTES NFR BLD AUTO: 35.1 %
MAN DIFF?: NORMAL
MCHC RBC-ENTMCNC: 31.1 PG
MCHC RBC-ENTMCNC: 31.7 GM/DL
MCV RBC AUTO: 98.3 FL
MONOCYTES # BLD AUTO: 0.49 K/UL
MONOCYTES NFR BLD AUTO: 9.5 %
NEUTROPHILS # BLD AUTO: 2.58 K/UL
NEUTROPHILS NFR BLD AUTO: 49.8 %
PLATELET # BLD AUTO: 223 K/UL
POTASSIUM SERPL-SCNC: 4.4 MMOL/L
PROT SERPL-MCNC: 7.2 G/DL
PSA SERPL-MCNC: 0.69 NG/ML
RBC # BLD: 5.3 M/UL
RBC # FLD: 13.2 %
SARS-COV-2 IGG SERPL IA-ACNC: <0.1 INDEX
SARS-COV-2 IGG SERPL QL IA: NEGATIVE
SODIUM SERPL-SCNC: 143 MMOL/L
TRIGL SERPL-MCNC: 313 MG/DL
TSH SERPL-ACNC: 3.07 UIU/ML
WBC # FLD AUTO: 5.18 K/UL

## 2020-08-24 ENCOUNTER — TRANSCRIPTION ENCOUNTER (OUTPATIENT)
Age: 50
End: 2020-08-24

## 2020-08-24 RX ORDER — TOBRAMYCIN AND DEXAMETHASONE 3; 1 MG/ML; MG/ML
0.3-0.1 SUSPENSION/ DROPS OPHTHALMIC
Qty: 1 | Refills: 0 | Status: ACTIVE | COMMUNITY
Start: 2020-08-24 | End: 1900-01-01

## 2020-08-28 ENCOUNTER — TRANSCRIPTION ENCOUNTER (OUTPATIENT)
Age: 50
End: 2020-08-28

## 2020-10-27 ENCOUNTER — TRANSCRIPTION ENCOUNTER (OUTPATIENT)
Age: 50
End: 2020-10-27

## 2021-02-09 ENCOUNTER — APPOINTMENT (OUTPATIENT)
Dept: INTERNAL MEDICINE | Facility: CLINIC | Age: 51
End: 2021-02-09

## 2021-05-06 ENCOUNTER — TRANSCRIPTION ENCOUNTER (OUTPATIENT)
Age: 51
End: 2021-05-06

## 2021-05-06 ENCOUNTER — RX RENEWAL (OUTPATIENT)
Age: 51
End: 2021-05-06

## 2021-05-26 NOTE — H&P PST ADULT - NS MD HP HEP C STATUS
13-year-old male with past medical history of clubfoot presents to the emergency department due to drug overdose. Patient states he was with his girlfriend and he insufflated  \"bump of heroin\". Does not recall the events that led to him to come to the emergency department. Medics said when they responded he was apneic and they bagged him gave him Narcan intranasally and intravenous. He is alert awake talking. Patient is pleasant. He says he is upset about the event. No chest pain no shortness of breath no nausea no vomiting no fevers no chills no other issues expressed. Patient reports he was not trying to hurt himself or anyone else. It was an accidental overdose. The history is provided by the patient and the EMS personnel.    Drug Overdose         Past Medical History:   Diagnosis Date    Club foot     Foot deformity, congenital     Musculoskeletal disorder     Pneumonia        Past Surgical History:   Procedure Laterality Date    HX APPENDECTOMY  2007    HX ORTHOPAEDIC Bilateral 1986    ryan correction of club foot    HX TONSIL AND ADENOIDECTOMY  1992         Family History:   Problem Relation Age of Onset    Heart Disease Father 61        CAD    Heart Attack Father 58    Cancer Father         lung cancer    Cancer Maternal Grandmother         liver    Cancer Paternal Grandfather         lung       Social History     Socioeconomic History    Marital status: SINGLE     Spouse name: Not on file    Number of children: 0    Years of education: 15    Highest education level: Not on file   Occupational History    Occupation: [employer]     Employer: NOT EMPLOYED     Comment: unemployed since May 2014   Tobacco Use    Smoking status: Current Every Day Smoker     Packs/day: 0.50     Types: Cigarettes    Smokeless tobacco: Never Used   Substance and Sexual Activity    Alcohol use: Yes     Comment: 3 beers a few times a weeks    Drug use: Yes     Types: Marijuana    Sexual activity: Yes Partners: Female   Other Topics Concern     Service No    Blood Transfusions No    Caffeine Concern Yes    Occupational Exposure No    Hobby Hazards No    Sleep Concern No    Stress Concern No    Weight Concern No    Special Diet No    Back Care Yes    Exercise No    Bike Helmet No    Seat Belt Yes    Self-Exams Not Asked   Social History Narrative    Not on file     Social Determinants of Health     Financial Resource Strain:     Difficulty of Paying Living Expenses:    Food Insecurity:     Worried About Running Out of Food in the Last Year:     [address] in the Last Year:    Transportation Needs:     Lack of Transportation (Medical):  Lack of Transportation (Non-Medical):    Physical Activity:     Days of Exercise per Week:     Minutes of Exercise per Session:    Stress:     Feeling of Stress :    Social Connections:     Frequency of Communication with Friends and Family:     Frequency of Social Gatherings with Friends and Family:     Attends Jew Services:     Active Member of Clubs or Organizations:     Attends Club or Organization Meetings:     Marital Status:    Intimate Partner Violence:     Fear of Current or Ex-Partner:     Emotionally Abused:     Physically Abused:     Sexually Abused: ALLERGIES: Norco [hydrocodone-acetaminophen]    Review of Systems   Constitutional: Negative. Respiratory: Negative. Cardiovascular: Negative. Musculoskeletal: Negative. Neurological: Negative. Hematological: Negative. Psychiatric/Behavioral: Negative. All other systems reviewed and are negative. Vitals:    05/26/21 0145   BP: (!) 161/94   Pulse: 95   Resp: 16   Temp: 97.5 °F (36.4 °C)   SpO2: 100%            Physical Exam  Vitals and nursing note reviewed. Constitutional:       Appearance: Normal appearance. HENT:      Head: Normocephalic and atraumatic.       Nose: Nose normal.      Mouth/Throat:      Mouth: Mucous membranes are moist.   Eyes:      Extraocular Movements: Extraocular movements intact. Pupils: Pupils are equal, round, and reactive to light. Cardiovascular:      Rate and Rhythm: Normal rate and regular rhythm. Pulses: Normal pulses. Pulmonary:      Effort: Pulmonary effort is normal.      Breath sounds: Normal breath sounds. Abdominal:      General: There is no distension. Palpations: Abdomen is soft. There is no mass. Tenderness: There is no abdominal tenderness. There is no right CVA tenderness, left CVA tenderness, guarding or rebound. Hernia: No hernia is present. Musculoskeletal:         General: Normal range of motion. Cervical back: Normal range of motion and neck supple. Skin:     General: Skin is warm. Capillary Refill: Capillary refill takes less than 2 seconds. Neurological:      General: No focal deficit present. Mental Status: He is alert and oriented to person, place, and time. Psychiatric:         Mood and Affect: Mood normal.         Behavior: Behavior normal.          MDM  Number of Diagnoses or Management Options  Diagnosis management comments: Has been observed for over 4 hours. His vitals are stable. He is able to walk without issue. Patient is neurologically intact. Feels safe to go home. Looks well. Counseled him about drug use.     Risk of Complications, Morbidity, and/or Mortality  Presenting problems: high  Diagnostic procedures: moderate  Management options: moderate    Patient Progress  Patient progress: improved         Procedures Negative

## 2021-07-08 ENCOUNTER — APPOINTMENT (OUTPATIENT)
Dept: INTERNAL MEDICINE | Facility: CLINIC | Age: 51
End: 2021-07-08
Payer: COMMERCIAL

## 2021-07-08 VITALS
BODY MASS INDEX: 47.74 KG/M2 | WEIGHT: 315 LBS | DIASTOLIC BLOOD PRESSURE: 80 MMHG | HEIGHT: 67.99 IN | HEART RATE: 103 BPM | TEMPERATURE: 97.3 F | SYSTOLIC BLOOD PRESSURE: 118 MMHG | OXYGEN SATURATION: 93 %

## 2021-07-08 PROCEDURE — 99396 PREV VISIT EST AGE 40-64: CPT

## 2021-07-08 PROCEDURE — G0447 BEHAVIOR COUNSEL OBESITY 15M: CPT

## 2021-07-08 PROCEDURE — G0442 ANNUAL ALCOHOL SCREEN 15 MIN: CPT

## 2021-07-08 PROCEDURE — 99215 OFFICE O/P EST HI 40 MIN: CPT | Mod: 25

## 2021-07-08 PROCEDURE — G0444 DEPRESSION SCREEN ANNUAL: CPT | Mod: 59

## 2021-07-08 NOTE — ASSESSMENT
[FreeTextEntry1] : \par Preventive visit: pt is up to date with vaccines including Tdap; he is up to date with colon cancer screening; prostate cancer screening done today with PSA testing; praised pt's tobacco-free lifestyle; encouraged use of smoke/CO detectors in the house and use of seatbelts when in vehicles\par \par Medical Issue 1: Anxiety: unstable and worsening with difficulty controlling symptoms off medication; this was complicated by visit to mental health hospital since self-d/c'ing medication which led to suicidal thoughts; the thoughts have since resolved and he restarted care with his psychiatrist along with medication; coordinated follow-up care with psych\par \par Medical Issue 2: Asthma: unstable and variably controlled with episodes of SOB occurring intermittently but usually associated with anxiety; prescribed inhalers and coordinated follow-up care with pulmonologist\par \par Medical Issue 3: Eczema: unstable with persistently dry skin that is itchy and bothersome; advised hydration with Aloe gel and alternating with steroid cream as anti-inflammatory agent; coordinated follow-up care with dermatology\par \par Medical Issue 4: Morbid obesity counselling: 15 mins, assessed BMI at 50 and above now in morbidly obese range; advised weight loss, exercise routine and diet; pt agreed to start exercise program for target weight loss 20 lbs; assisted patient with resources including nutrition referral as needed and arranged for follow-up to monitor weight loss progress over next several months\par \par Medical Issue 5: ESME: stable and controlled with CPAP machine; continue current plan of care and coordinated follow-up care with sleep medicine team for titration of CPAP settings\par \par Annual alcohol misuse screen, 15 mins, done; negative\par \par Depression screen performed today, PHQ2=0, negative.\par

## 2021-07-08 NOTE — HEALTH RISK ASSESSMENT
[Good] : ~his/her~  mood as  good [No] : No [No falls in past year] : Patient reported no falls in the past year [None] : None [Alone] : lives alone [Employed] : employed [Single] : single [Feels Safe at Home] : Feels safe at home [Fully functional (bathing, dressing, toileting, transferring, walking, feeding)] : Fully functional (bathing, dressing, toileting, transferring, walking, feeding) [Fully functional (using the telephone, shopping, preparing meals, housekeeping, doing laundry, using] : Fully functional and needs no help or supervision to perform IADLs (using the telephone, shopping, preparing meals, housekeeping, doing laundry, using transportation, managing medications and managing finances) [Reports normal functional visual acuity (ie: able to read med bottle)] : Reports normal functional visual acuity [Smoke Detector] : smoke detector [Carbon Monoxide Detector] : carbon monoxide detector [Safety elements used in home] : safety elements used in home [Seat Belt] :  uses seat belt [Sunscreen] : uses sunscreen [] : No [Change in mental status noted] : No change in mental status noted [Language] : denies difficulty with language [Behavior] : denies difficulty with behavior [Learning/Retaining New Information] : denies difficulty learning/retaining new information [Handling Complex Tasks] : denies difficulty handling complex tasks [Reasoning] : denies difficulty with reasoning [Spatial Ability and Orientation] : denies difficulty with spatial ability and orientation [Sexually Active] : not sexually active [High Risk Behavior] : no high risk behavior [Reports changes in hearing] : Reports no changes in hearing [Reports changes in vision] : Reports no changes in vision [Reports changes in dental health] : Reports no changes in dental health [Guns at Home] : no guns at home [Travel to Developing Areas] : does not  travel to developing areas [TB Exposure] : is not being exposed to tuberculosis [Caregiver Concerns] : does not have caregiver concerns

## 2021-07-08 NOTE — PHYSICAL EXAM
[Well Nourished] : well nourished [Well Developed] : well developed [Well-Appearing] : well-appearing [Normal Sclera/Conjunctiva] : normal sclera/conjunctiva [PERRL] : pupils equal round and reactive to light [EOMI] : extraocular movements intact [No JVD] : no jugular venous distention [No Lymphadenopathy] : no lymphadenopathy [Supple] : supple [No Respiratory Distress] : no respiratory distress  [No Accessory Muscle Use] : no accessory muscle use [Clear to Auscultation] : lungs were clear to auscultation bilaterally [Normal Rate] : normal rate  [Regular Rhythm] : with a regular rhythm [Normal S1, S2] : normal S1 and S2 [No Murmur] : no murmur heard [No Carotid Bruits] : no carotid bruits [No Abdominal Bruit] : a ~M bruit was not heard ~T in the abdomen [No Varicosities] : no varicosities [Pedal Pulses Present] : the pedal pulses are present [No Edema] : there was no peripheral edema [No Palpable Aorta] : no palpable aorta [No Extremity Clubbing/Cyanosis] : no extremity clubbing/cyanosis [Soft] : abdomen soft [Non Tender] : non-tender [Non-distended] : non-distended [No Masses] : no abdominal mass palpated [No HSM] : no HSM [Normal Bowel Sounds] : normal bowel sounds [Normal Posterior Cervical Nodes] : no posterior cervical lymphadenopathy [Normal Anterior Cervical Nodes] : no anterior cervical lymphadenopathy [No CVA Tenderness] : no CVA  tenderness [No Spinal Tenderness] : no spinal tenderness [No Joint Swelling] : no joint swelling [Grossly Normal Strength/Tone] : grossly normal strength/tone [No Rash] : no rash [Coordination Grossly Intact] : coordination grossly intact [No Focal Deficits] : no focal deficits [Normal Gait] : normal gait [Deep Tendon Reflexes (DTR)] : deep tendon reflexes were 2+ and symmetric [Normal Affect] : the affect was normal [Normal Insight/Judgement] : insight and judgment were intact

## 2021-07-08 NOTE — HISTORY OF PRESENT ILLNESS
[FreeTextEntry1] : Presents for preventive visit as well as concerns regarding anxiety, asthma, eczema, morbid obesity and ESME. [de-identified] : \par Preventive visit: pt is up to date with vaccines including Tdap; he is up to date with colon cancer screening; he is due for prostate cancer screening with PSA testing; he does not smoke cigarettes and does not misuse alcohol; he feels safe at home and has smoke/CO detectors in the house; he wears seatbelts when in vehicles\par \par Medical Issue 1: Anxiety: unstable and worsening with difficulty controlling symptoms off medication; this was complicated by visit to mental health hospital since self-d/c'ing medication which led to suicidal thoughts; the thoughts have since resolved and he restarted care with his psychiatrist along with medication; he is looking for new psychologist and would like resources to help find one\par \par Medical Issue 2: Asthma: unstable and variably controlled with episodes of SOB occurring intermittently but usually associated with anxiety; he has inhalers which he uses as needed; he denies nighttime awakenings and chest pain; he follows with a pulmonologist as well\par \par Medical Issue 3: Eczema: unstable with persistently dry skin that is itchy and bothersome; he would like second opinion with new dermatologist and needs help with resources to set this up\par \par Medical Issue 4: Morbid obesity: unstable and worsening with increase in BMI and body weight; he has no diet and no exercise routine yet but he did join a gym and is motivated to start using it regularly; he denies chest pain with exertion and shortness of breath; he would like help with resources to achieve his weight loss goals\par \par Medical Issue 5: ESME: stable and controlled with CPAP machine; he lives alone so does not have anyone to tell him if he snores loudly or gasps for air overnight while sleeping; he reports no daytime somnolence and he wakes up feeling refreshed in the mornings

## 2021-07-12 LAB
ALBUMIN SERPL ELPH-MCNC: 4.7 G/DL
ALP BLD-CCNC: 104 U/L
ALT SERPL-CCNC: 47 U/L
ANION GAP SERPL CALC-SCNC: 14 MMOL/L
AST SERPL-CCNC: 40 U/L
BASOPHILS # BLD AUTO: 0.03 K/UL
BASOPHILS NFR BLD AUTO: 0.5 %
BILIRUB SERPL-MCNC: 0.6 MG/DL
BUN SERPL-MCNC: 12 MG/DL
CALCIUM SERPL-MCNC: 10 MG/DL
CHLORIDE SERPL-SCNC: 104 MMOL/L
CHOLEST SERPL-MCNC: 189 MG/DL
CO2 SERPL-SCNC: 22 MMOL/L
CREAT SERPL-MCNC: 0.91 MG/DL
EOSINOPHIL # BLD AUTO: 0.22 K/UL
EOSINOPHIL NFR BLD AUTO: 3.3 %
ESTIMATED AVERAGE GLUCOSE: 111 MG/DL
GLUCOSE SERPL-MCNC: 104 MG/DL
HBA1C MFR BLD HPLC: 5.5 %
HCT VFR BLD CALC: 47.5 %
HDLC SERPL-MCNC: 31 MG/DL
HGB BLD-MCNC: 16.1 G/DL
HIV1+2 AB SPEC QL IA.RAPID: NONREACTIVE
IMM GRANULOCYTES NFR BLD AUTO: 0.5 %
LDLC SERPL CALC-MCNC: 99 MG/DL
LYMPHOCYTES # BLD AUTO: 1.64 K/UL
LYMPHOCYTES NFR BLD AUTO: 24.7 %
MAN DIFF?: NORMAL
MCHC RBC-ENTMCNC: 31.3 PG
MCHC RBC-ENTMCNC: 33.9 GM/DL
MCV RBC AUTO: 92.2 FL
MONOCYTES # BLD AUTO: 0.63 K/UL
MONOCYTES NFR BLD AUTO: 9.5 %
NEUTROPHILS # BLD AUTO: 4.09 K/UL
NEUTROPHILS NFR BLD AUTO: 61.5 %
NONHDLC SERPL-MCNC: 158 MG/DL
PLATELET # BLD AUTO: 262 K/UL
POTASSIUM SERPL-SCNC: 4.2 MMOL/L
PROT SERPL-MCNC: 7.7 G/DL
PSA SERPL-MCNC: 0.76 NG/ML
RBC # BLD: 5.15 M/UL
RBC # FLD: 12.3 %
SODIUM SERPL-SCNC: 140 MMOL/L
TESTOST BND SERPL-MCNC: 9 PG/ML
TESTOSTERONE TOTAL S: 259 NG/DL
TRIGL SERPL-MCNC: 296 MG/DL
TSH SERPL-ACNC: 2.54 UIU/ML
WBC # FLD AUTO: 6.64 K/UL

## 2021-07-20 LAB — HEMOCCULT STL QL IA: NEGATIVE

## 2021-08-27 ENCOUNTER — APPOINTMENT (OUTPATIENT)
Dept: INTERNAL MEDICINE | Facility: CLINIC | Age: 51
End: 2021-08-27
Payer: COMMERCIAL

## 2021-08-27 VITALS
OXYGEN SATURATION: 96 % | HEIGHT: 67.99 IN | BODY MASS INDEX: 47.74 KG/M2 | SYSTOLIC BLOOD PRESSURE: 140 MMHG | TEMPERATURE: 97.6 F | WEIGHT: 315 LBS | DIASTOLIC BLOOD PRESSURE: 73 MMHG | HEART RATE: 100 BPM

## 2021-08-27 PROCEDURE — 99215 OFFICE O/P EST HI 40 MIN: CPT

## 2021-08-27 PROCEDURE — G0447 BEHAVIOR COUNSEL OBESITY 15M: CPT

## 2021-08-27 NOTE — ASSESSMENT
[FreeTextEntry1] : \par Medical Issue 1: Anxiety: unstable and worsening with difficulty controlling symptoms off medication; this was complicated by visit to mental health hospital since self-d/c'ing medication which led to suicidal thoughts; the thoughts have since resolved and he restarted care with his psychiatrist along with medication; coordinated follow-up care with psych\par \par Medical Issue 2: Asthma: unstable and variably controlled with episodes of SOB occurring intermittently but usually associated with anxiety; prescribed inhalers and coordinated follow-up care with pulmonologist\par \par Medical Issue 3: Eczema: unstable with persistently dry skin that is itchy and bothersome; advised hydration with Aloe gel and alternating with steroid cream as anti-inflammatory agent; coordinated follow-up care with dermatology\[facility] \par Medical Issue 4: Morbid obesity counselling: 15 mins, assessed BMI at 50 and above now in morbidly obese range; advised weight loss, exercise routine and diet; pt agreed to start exercise program for target weight loss 20 lbs; assisted patient with resources including nutrition referral as needed and arranged for follow-up to monitor weight loss progress over next several months\par \par Medical Issue 5: EMSE: stable and controlled with CPAP machine; continue current plan of care and coordinated follow-up care with sleep medicine team for titration of CPAP settings\par \par Medical Issue 6: Hypertriglyceridemia: unstable and poorly controlled; rec starting Gemfibrozil over next 3 mos if trigs remain elevated despite his lifestyle change efforts

## 2021-08-27 NOTE — HISTORY OF PRESENT ILLNESS
[FreeTextEntry1] : Presents with concerns regarding anxiety, asthma, eczema, morbid obesity, ESME and hypertriglyceridemia. [de-identified] : \par Medical Issue 1: Anxiety: unstable and worsening with difficulty controlling symptoms off medication; this was complicated by visit to mental health hospital since self-d/c'ing medication which led to suicidal thoughts; the thoughts have since resolved and he restarted care with his psychiatrist along with medication; he is looking for new psychologist and would like resources to help find one\par \par Medical Issue 2: Asthma: unstable and variably controlled with episodes of SOB occurring intermittently but usually associated with anxiety; he has inhalers which he uses as needed; he denies nighttime awakenings and chest pain; he follows with a pulmonologist as well\par \par Medical Issue 3: Eczema: unstable with persistently dry skin that is itchy and bothersome; he would like second opinion with new dermatologist and needs help with resources to set this up\par \par Medical Issue 4: Morbid obesity: unstable and worsening with increase in BMI and body weight; he has no diet and no exercise routine yet but he did join a gym and is motivated to start using it regularly; he denies chest pain with exertion and shortness of breath; he would like help with resources to achieve his weight loss goals\par \par Medical Issue 5: ESME: stable and controlled with CPAP machine; he lives alone so does not have anyone to tell him if he snores loudly or gasps for air overnight while sleeping; he reports no daytime somnolence and he wakes up feeling refreshed in the mornings\par \par Medical Issue 6: Hypertriglyceridemia: unstable and poorly controlled; not currently on medication; he is trying to modify his diet and work on exercise routine for weight loss; he denies chest pains, palpitations and shortness of breath

## 2021-09-23 ENCOUNTER — APPOINTMENT (OUTPATIENT)
Dept: OPHTHALMOLOGY | Facility: CLINIC | Age: 51
End: 2021-09-23
Payer: COMMERCIAL

## 2021-09-23 ENCOUNTER — NON-APPOINTMENT (OUTPATIENT)
Age: 51
End: 2021-09-23

## 2021-09-23 PROCEDURE — 92015 DETERMINE REFRACTIVE STATE: CPT

## 2021-09-23 PROCEDURE — 92014 COMPRE OPH EXAM EST PT 1/>: CPT

## 2021-10-13 ENCOUNTER — TRANSCRIPTION ENCOUNTER (OUTPATIENT)
Age: 51
End: 2021-10-13

## 2021-11-30 ENCOUNTER — APPOINTMENT (OUTPATIENT)
Dept: INTERNAL MEDICINE | Facility: CLINIC | Age: 51
End: 2021-11-30
Payer: COMMERCIAL

## 2021-11-30 VITALS
BODY MASS INDEX: 47.74 KG/M2 | TEMPERATURE: 97.4 F | HEIGHT: 68 IN | WEIGHT: 315 LBS | HEART RATE: 93 BPM | OXYGEN SATURATION: 98 %

## 2021-11-30 VITALS — SYSTOLIC BLOOD PRESSURE: 128 MMHG | DIASTOLIC BLOOD PRESSURE: 68 MMHG

## 2021-11-30 PROCEDURE — G0447 BEHAVIOR COUNSEL OBESITY 15M: CPT

## 2021-11-30 PROCEDURE — 99215 OFFICE O/P EST HI 40 MIN: CPT

## 2021-11-30 RX ORDER — TRIAMCINOLONE ACETONIDE 1 MG/G
0.1 CREAM TOPICAL TWICE DAILY
Qty: 3 | Refills: 3 | Status: ACTIVE | COMMUNITY
Start: 2021-11-30 | End: 1900-01-01

## 2021-11-30 RX ORDER — CLOTRIMAZOLE AND BETAMETHASONE DIPROPIONATE 10; .5 MG/G; MG/G
1-0.05 CREAM TOPICAL
Qty: 135 | Refills: 3 | Status: ACTIVE | COMMUNITY
Start: 2021-11-30 | End: 1900-01-01

## 2021-11-30 NOTE — PHYSICAL EXAM
[Well Nourished] : well nourished [Well Developed] : well developed [Well-Appearing] : well-appearing [Normal Sclera/Conjunctiva] : normal sclera/conjunctiva [PERRL] : pupils equal round and reactive to light [EOMI] : extraocular movements intact [No JVD] : no jugular venous distention [No Lymphadenopathy] : no lymphadenopathy [Supple] : supple [Thyroid Normal, No Nodules] : the thyroid was normal and there were no nodules present [No Respiratory Distress] : no respiratory distress  [No Accessory Muscle Use] : no accessory muscle use [Clear to Auscultation] : lungs were clear to auscultation bilaterally [Normal Rate] : normal rate  [Regular Rhythm] : with a regular rhythm [Normal S1, S2] : normal S1 and S2 [No Murmur] : no murmur heard [No Carotid Bruits] : no carotid bruits [No Abdominal Bruit] : a ~M bruit was not heard ~T in the abdomen [No Varicosities] : no varicosities [Pedal Pulses Present] : the pedal pulses are present [No Edema] : there was no peripheral edema [No Palpable Aorta] : no palpable aorta [No Extremity Clubbing/Cyanosis] : no extremity clubbing/cyanosis [Soft] : abdomen soft [Non Tender] : non-tender [Non-distended] : non-distended [No Masses] : no abdominal mass palpated [No HSM] : no HSM [Normal Bowel Sounds] : normal bowel sounds [Normal Posterior Cervical Nodes] : no posterior cervical lymphadenopathy [Normal Anterior Cervical Nodes] : no anterior cervical lymphadenopathy [No CVA Tenderness] : no CVA  tenderness [No Spinal Tenderness] : no spinal tenderness [No Joint Swelling] : no joint swelling [Grossly Normal Strength/Tone] : grossly normal strength/tone [No Rash] : no rash [Coordination Grossly Intact] : coordination grossly intact [No Focal Deficits] : no focal deficits [Normal Gait] : normal gait [Deep Tendon Reflexes (DTR)] : deep tendon reflexes were 2+ and symmetric [Normal Affect] : the affect was normal [Normal Insight/Judgement] : insight and judgment were intact

## 2021-11-30 NOTE — HISTORY OF PRESENT ILLNESS
[FreeTextEntry1] : Presents with concerns regarding anxiety, asthma, eczema, morbid obesity, ESME and hypertriglyceridemia. [de-identified] : \par Medical Issue 1: Anxiety: unstable and worsening with difficulty controlling symptoms off medication; this was complicated by visit to mental health hospital since self-d/c'ing medication which led to suicidal thoughts; the thoughts have since resolved and he restarted care with his psychiatrist along with medication; he is looking for new psychologist and would like resources to help find one\par \par Medical Issue 2: Asthma: unstable and variably controlled with episodes of SOB occurring intermittently but usually associated with anxiety; he has inhalers which he uses as needed; he denies nighttime awakenings and chest pain; he follows with a pulmonologist as well\par \par Medical Issue 3: Eczema: unstable with persistently dry skin that is itchy and bothersome; he would like second opinion with new dermatologist and needs help with resources to set this up\par \par Medical Issue 4: Morbid obesity: unstable and worsening with increase in BMI and body weight; he has no diet and no exercise routine yet but he did join a gym and is motivated to start using it regularly; he denies chest pain with exertion and shortness of breath; he would like help with resources to achieve his weight loss goals\par \par Medical Issue 5: ESME: stable and controlled with CPAP machine; he lives alone so does not have anyone to tell him if he snores loudly or gasps for air overnight while sleeping; he reports no daytime somnolence and he wakes up feeling refreshed in the mornings\par \par Medical Issue 6: Hypertriglyceridemia: unstable and poorly controlled; not currently on medication; he is trying to modify his diet and work on exercise routine for weight loss; he denies chest pains, palpitations and shortness of breath

## 2021-11-30 NOTE — ASSESSMENT
[FreeTextEntry1] : \par Medical Issue 1: Anxiety: unstable and worsening with difficulty controlling symptoms off medication; this was complicated by visit to mental health hospital since self-d/c'ing medication which led to suicidal thoughts; the thoughts have since resolved and he restarted care with his psychiatrist along with medication; coordinated follow-up care with psych\par \par Medical Issue 2: Asthma: unstable and variably controlled with episodes of SOB occurring intermittently but usually associated with anxiety; prescribed inhalers and coordinated follow-up care with pulmonologist\par \par Medical Issue 3: Eczema: unstable with persistently dry skin that is itchy and bothersome; advised hydration with Aloe gel and alternating with steroid cream as anti-inflammatory agent; coordinated follow-up care with dermatology\[facility] \par Medical Issue 4: Morbid obesity counselling: 15 mins, assessed BMI at 50 and above now in morbidly obese range; advised weight loss, exercise routine and diet; pt agreed to start exercise program for target weight loss 20 lbs; assisted patient with resources including nutrition referral as needed and arranged for follow-up to monitor weight loss progress over next several months\par \par Medical Issue 5: ESME: stable and controlled with CPAP machine; continue current plan of care and coordinated follow-up care with sleep medicine team for titration of CPAP settings\par \par Medical Issue 6: Hypertriglyceridemia: unstable and poorly controlled; rec starting Gemfibrozil over next 3 mos if trigs remain elevated despite his lifestyle change efforts

## 2022-03-08 ENCOUNTER — APPOINTMENT (OUTPATIENT)
Dept: INTERNAL MEDICINE | Facility: CLINIC | Age: 52
End: 2022-03-08

## 2022-04-18 ENCOUNTER — APPOINTMENT (OUTPATIENT)
Dept: INTERNAL MEDICINE | Facility: CLINIC | Age: 52
End: 2022-04-18
Payer: COMMERCIAL

## 2022-04-18 VITALS
HEIGHT: 68 IN | WEIGHT: 315 LBS | HEART RATE: 86 BPM | TEMPERATURE: 97.3 F | OXYGEN SATURATION: 91 % | BODY MASS INDEX: 47.74 KG/M2

## 2022-04-18 VITALS — DIASTOLIC BLOOD PRESSURE: 72 MMHG | SYSTOLIC BLOOD PRESSURE: 128 MMHG

## 2022-04-18 LAB
ALBUMIN SERPL ELPH-MCNC: 4.3 G/DL
ALP BLD-CCNC: 98 U/L
ALT SERPL-CCNC: 40 U/L
ANION GAP SERPL CALC-SCNC: 14 MMOL/L
AST SERPL-CCNC: 38 U/L
BASOPHILS # BLD AUTO: 0.03 K/UL
BASOPHILS NFR BLD AUTO: 0.6 %
BILIRUB SERPL-MCNC: 0.5 MG/DL
BUN SERPL-MCNC: 14 MG/DL
CALCIUM SERPL-MCNC: 9 MG/DL
CHLORIDE SERPL-SCNC: 102 MMOL/L
CHOLEST SERPL-MCNC: 184 MG/DL
CO2 SERPL-SCNC: 24 MMOL/L
CREAT SERPL-MCNC: 0.81 MG/DL
EGFR: 106 ML/MIN/1.73M2
EOSINOPHIL # BLD AUTO: 0.19 K/UL
EOSINOPHIL NFR BLD AUTO: 3.6 %
ESTIMATED AVERAGE GLUCOSE: 117 MG/DL
GLUCOSE SERPL-MCNC: 86 MG/DL
HBA1C MFR BLD HPLC: 5.7 %
HCT VFR BLD CALC: 44.9 %
HDLC SERPL-MCNC: 31 MG/DL
HGB BLD-MCNC: 16.2 G/DL
IMM GRANULOCYTES NFR BLD AUTO: 0.4 %
LDLC SERPL CALC-MCNC: 99 MG/DL
LYMPHOCYTES # BLD AUTO: 1.65 K/UL
LYMPHOCYTES NFR BLD AUTO: 31.1 %
MAN DIFF?: NORMAL
MCHC RBC-ENTMCNC: 34 PG
MCHC RBC-ENTMCNC: 36.1 GM/DL
MCV RBC AUTO: 94.3 FL
MONOCYTES # BLD AUTO: 0.54 K/UL
MONOCYTES NFR BLD AUTO: 10.2 %
NEUTROPHILS # BLD AUTO: 2.87 K/UL
NEUTROPHILS NFR BLD AUTO: 54.1 %
NONHDLC SERPL-MCNC: 153 MG/DL
PLATELET # BLD AUTO: 224 K/UL
POTASSIUM SERPL-SCNC: 4.3 MMOL/L
PROT SERPL-MCNC: 7 G/DL
PSA SERPL-MCNC: 0.83 NG/ML
RBC # BLD: 4.76 M/UL
RBC # FLD: 12.9 %
SODIUM SERPL-SCNC: 140 MMOL/L
TESTOST FREE SERPL-MCNC: 6.8 PG/ML
TESTOST SERPL-MCNC: 274 NG/DL
TRIGL SERPL-MCNC: 266 MG/DL
TSH SERPL-ACNC: 3.27 UIU/ML
WBC # FLD AUTO: 5.3 K/UL

## 2022-04-18 PROCEDURE — 99215 OFFICE O/P EST HI 40 MIN: CPT

## 2022-04-18 PROCEDURE — G0447 BEHAVIOR COUNSEL OBESITY 15M: CPT

## 2022-04-18 NOTE — ASSESSMENT
[FreeTextEntry1] : \par Medical Issue 1: Anxiety: unstable and worsening with difficulty controlling symptoms off medication; this was complicated by visit to mental health hospital since self-d/c'ing medication which led to suicidal thoughts; the thoughts have since resolved and he restarted care with his psychiatrist along with medication; coordinated follow-up care with psych\par \par Medical Issue 2: Asthma: unstable and variably controlled with episodes of SOB occurring intermittently but usually associated with anxiety; prescribed inhalers and coordinated follow-up care with pulmonologist\par \par Medical Issue 3: Eczema: unstable with persistently dry skin that is itchy and bothersome; advised hydration with Aloe gel and alternating with steroid cream as anti-inflammatory agent; coordinated follow-up care with dermatology\par \par Medical Issue 4: Morbid obesity counselling: 15 mins, assessed BMI at 50 and above now in morbidly obese range; advised weight loss, exercise routine and diet; pt agreed to start exercise program for target weight loss 20 lbs; assisted patient with resources including nutrition referral as needed and arranged for follow-up to monitor weight loss progress over next several months\par \par Medical Issue 5: ESME: stable and controlled with CPAP machine; continue current plan of care and coordinated follow-up care with sleep medicine team for titration of CPAP settings\par \par Medical Issue 6: Hypertriglyceridemia: unstable and poorly controlled; rec starting Gemfibrozil over next 3 mos if trigs remain elevated despite his lifestyle change efforts\par \par Medical Issue 7: Chest pain: 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

## 2022-04-18 NOTE — HISTORY OF PRESENT ILLNESS
[FreeTextEntry1] : Presents with concerns regarding anxiety, asthma, eczema, morbid obesity, ESME and hypertriglyceridemia. Went to [place] ED for chest pain. [de-identified] : \par Medical Issue 1: Anxiety: unstable and worsening with difficulty controlling symptoms off medication; this was complicated by visit to mental health hospital since self-d/c'ing medication which led to suicidal thoughts; the thoughts have since resolved and he restarted care with his psychiatrist along with medication; he is looking for new psychologist and would like resources to help find one\par \par Medical Issue 2: Asthma: unstable and variably controlled with episodes of SOB occurring intermittently but usually associated with anxiety; he has inhalers which he uses as needed; he denies nighttime awakenings and chest pain; he follows with a pulmonologist as well\par \par Medical Issue 3: Eczema: unstable with persistently dry skin that is itchy and bothersome; he would like second opinion with new dermatologist and needs help with resources to set this up\par \par Medical Issue 4: Morbid obesity: unstable and worsening with increase in BMI and body weight; he has no diet and no exercise routine yet but he did join a gym and is motivated to start using it regularly; he denies chest pain with exertion and shortness of breath; he would like help with resources to achieve his weight loss goals\par \par Medical Issue 5: ESME: stable and controlled with CPAP machine; he lives alone so does not have anyone to tell him if he snores loudly or gasps for air overnight while sleeping; he reports no daytime somnolence and he wakes up feeling refreshed in the mornings\par \par Medical Issue 6: Hypertriglyceridemia: unstable and poorly controlled; not currently on medication; he is trying to modify his diet and work on exercise routine for weight loss; he denies chest pains, palpitations and shortness of breath\par \par Medical Issue 7: Chest pain: 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

## 2022-04-20 ENCOUNTER — NON-APPOINTMENT (OUTPATIENT)
Age: 52
End: 2022-04-20

## 2022-04-20 ENCOUNTER — APPOINTMENT (OUTPATIENT)
Dept: CARDIOLOGY | Facility: CLINIC | Age: 52
End: 2022-04-20
Payer: COMMERCIAL

## 2022-04-20 VITALS — DIASTOLIC BLOOD PRESSURE: 88 MMHG | SYSTOLIC BLOOD PRESSURE: 140 MMHG

## 2022-04-20 VITALS
BODY MASS INDEX: 47.74 KG/M2 | SYSTOLIC BLOOD PRESSURE: 158 MMHG | DIASTOLIC BLOOD PRESSURE: 90 MMHG | OXYGEN SATURATION: 91 % | HEART RATE: 89 BPM | RESPIRATION RATE: 12 BRPM | HEIGHT: 68 IN | WEIGHT: 315 LBS

## 2022-04-20 DIAGNOSIS — I20.9 ANGINA PECTORIS, UNSPECIFIED: ICD-10-CM

## 2022-04-20 PROCEDURE — 93000 ELECTROCARDIOGRAM COMPLETE: CPT

## 2022-04-20 PROCEDURE — 99204 OFFICE O/P NEW MOD 45 MIN: CPT

## 2022-04-20 NOTE — HISTORY OF PRESENT ILLNESS
[FreeTextEntry1] : Janes is a 51-year-old gentleman ESME, Htn, Hypertrigliceridemia who presents after ER visit to [place] for chest pain. 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 He had CXR, LE doppler, labs all negative. NO further sensation since then. Not compliant with PM dose gemfibrozil.

## 2022-04-29 ENCOUNTER — APPOINTMENT (OUTPATIENT)
Dept: CARDIOLOGY | Facility: CLINIC | Age: 52
End: 2022-04-29
Payer: COMMERCIAL

## 2022-04-29 PROCEDURE — 93306 TTE W/DOPPLER COMPLETE: CPT

## 2022-05-27 NOTE — H&P PST ADULT - NEGATIVE GASTROINTESTINAL SYMPTOMS
Group Therapy Note    Date: 5/27/2022    Group Start Time: 1000  Group End Time: 0026  Group Topic: Psychotherapy    [facility]  Av. AVIS Jacobo, LSW        Group Therapy Note    Attendees: 10         Patient's Goal:  To increase social interaction and improve relationships with others.      Notes:  Pt was attentive in group and was able to identify and agenda. Pt was able to verbalize relating to other and made connections. Status After Intervention:  Improved    Participation Level:  Active Listener and Interactive    Participation Quality: Appropriate, Attentive and Sharing      Speech:  normal      Thought Process/Content: Logical  Linear      Affective Functioning: Congruent      Mood: depressed      Level of consciousness:  Alert, Oriented x4 and Attentive      Response to Learning: Able to verbalize current knowledge/experience, Able to verbalize/acknowledge new learning, Able to retain information and Capable of insight      Endings: None Reported    Modes of Intervention: Support, Socialization and Exploration      Discipline Responsible: /Counselor      Signature:  AVIS Cerda, [facility] no nausea/no vomiting/no diarrhea/no constipation/no abdominal pain

## 2022-07-18 ENCOUNTER — APPOINTMENT (OUTPATIENT)
Dept: INTERNAL MEDICINE | Facility: CLINIC | Age: 52
End: 2022-07-18

## 2022-07-18 VITALS
OXYGEN SATURATION: 96 % | HEIGHT: 68 IN | DIASTOLIC BLOOD PRESSURE: 84 MMHG | TEMPERATURE: 96.3 F | BODY MASS INDEX: 47.74 KG/M2 | SYSTOLIC BLOOD PRESSURE: 143 MMHG | HEART RATE: 73 BPM | WEIGHT: 315 LBS

## 2022-07-18 PROCEDURE — 99396 PREV VISIT EST AGE 40-64: CPT

## 2022-07-18 PROCEDURE — 99215 OFFICE O/P EST HI 40 MIN: CPT | Mod: 25

## 2022-07-18 PROCEDURE — G0447 BEHAVIOR COUNSEL OBESITY 15M: CPT | Mod: 59

## 2022-07-18 NOTE — ASSESSMENT
[FreeTextEntry1] : \par Preventive visit: pt is up to date with vaccines including Tdap; he is up to date with colon cancer screening; prostate cancer screening done today with PSA testing; praised pt's tobacco-free lifestyle; encouraged use of smoke/CO detectors in the house and use of seatbelts when in vehicles\par \par Medical Issue 1: Anxiety: unstable and worsening with difficulty controlling symptoms off medication; this was complicated by visit to mental health hospital since self-d/c'ing medication which led to suicidal thoughts; the thoughts have since resolved and he restarted care with his psychiatrist along with medication; coordinated follow-up care with psych\par \par Medical Issue 2: Asthma: unstable and variably controlled with episodes of SOB occurring intermittently but usually associated with anxiety; prescribed inhalers and coordinated follow-up care with pulmonologist\par \par Medical Issue 3: Eczema: unstable with persistently dry skin that is itchy and bothersome; advised hydration with Aloe gel and alternating with steroid cream as anti-inflammatory agent; coordinated follow-up care with dermatology\[facility] \par Medical Issue 4: Morbid obesity counselling: 15 mins, assessed BMI at 50 and above now in morbidly obese range; advised weight loss, exercise routine and diet; pt agreed to start exercise program for target weight loss 20 lbs; assisted patient with resources including nutrition referral as needed and arranged for follow-up to monitor weight loss progress over next several months\par \par Medical Issue 5: ESME: stable and controlled with CPAP machine; continue current plan of care and coordinated follow-up care with sleep medicine team for titration of CPAP settings

## 2022-07-18 NOTE — HISTORY OF PRESENT ILLNESS
[FreeTextEntry1] : Presents for preventive visit as well as concerns regarding anxiety, asthma, eczema, morbid obesity and ESME. [de-identified] : \par Preventive visit: pt is up to date with vaccines including Tdap; he is up to date with colon cancer screening; he is due for prostate cancer screening with PSA testing; he does not smoke cigarettes and does not misuse alcohol; he feels safe at home and has smoke/CO detectors in the house; he wears seatbelts when in vehicles\par \par Medical Issue 1: Anxiety: unstable and worsening with difficulty controlling symptoms off medication; this was complicated by visit to mental health hospital since self-d/c'ing medication which led to suicidal thoughts; the thoughts have since resolved and he restarted care with his psychiatrist along with medication; he is looking for new psychologist and would like resources to help find one\par \par Medical Issue 2: Asthma: unstable and variably controlled with episodes of SOB occurring intermittently but usually associated with anxiety; he has inhalers which he uses as needed; he denies nighttime awakenings and chest pain; he follows with a pulmonologist as well\par \par Medical Issue 3: Eczema: unstable with persistently dry skin that is itchy and bothersome; he would like second opinion with new dermatologist and needs help with resources to set this up\par \par Medical Issue 4: Morbid obesity: unstable and worsening with increase in BMI and body weight; he has no diet and no exercise routine yet but he did join a gym and is motivated to start using it regularly; he denies chest pain with exertion and shortness of breath; he would like help with resources to achieve his weight loss goals\par \par Medical Issue 5: ESME: stable and controlled with CPAP machine; he lives alone so does not have anyone to tell him if he snores loudly or gasps for air overnight while sleeping; he reports no daytime somnolence and he wakes up feeling refreshed in the mornings

## 2022-07-18 NOTE — HEALTH RISK ASSESSMENT
[Good] : ~his/her~  mood as  good [No] : No [No falls in past year] : Patient reported no falls in the past year [0] : 2) Feeling down, depressed, or hopeless: Not at all (0) [PHQ-2 Negative - No further assessment needed] : PHQ-2 Negative - No further assessment needed [[id number]] : 0 [Change in mental status noted] : No change in mental status noted [Language] : denies difficulty with language [Behavior] : denies difficulty with behavior [Learning/Retaining New Information] : denies difficulty learning/retaining new information [Handling Complex Tasks] : denies difficulty handling complex tasks [Reasoning] : denies difficulty with reasoning [Spatial Ability and Orientation] : denies difficulty with spatial ability and orientation [None] : None [Alone] : lives alone [Employed] : employed [Single] : single [Sexually Active] : not sexually active [High Risk Behavior] : no high risk behavior [Feels Safe at Home] : Feels safe at home [Fully functional (bathing, dressing, toileting, transferring, walking, feeding)] : Fully functional (bathing, dressing, toileting, transferring, walking, feeding) [Fully functional (using the telephone, shopping, preparing meals, housekeeping, doing laundry, using] : Fully functional and needs no help or supervision to perform IADLs (using the telephone, shopping, preparing meals, housekeeping, doing laundry, using transportation, managing medications and managing finances) [Reports changes in hearing] : Reports no changes in hearing [Reports changes in vision] : Reports no changes in vision [Reports normal functional visual acuity (ie: able to read med bottle)] : Reports normal functional visual acuity [Reports changes in dental health] : Reports no changes in dental health [Smoke Detector] : smoke detector [Carbon Monoxide Detector] : carbon monoxide detector [Guns at Home] : no guns at home [Seat Belt] :  uses seat belt [Safety elements used in home] : safety elements used in home [Sunscreen] : uses sunscreen [Travel to Developing Areas] : does not  travel to developing areas [TB Exposure] : is not being exposed to tuberculosis [Caregiver Concerns] : does not have caregiver concerns

## 2022-07-20 LAB
25(OH)D3 SERPL-MCNC: 29.2 NG/ML
ALBUMIN SERPL ELPH-MCNC: 4.9 G/DL
ALP BLD-CCNC: 99 U/L
ALT SERPL-CCNC: 42 U/L
ANION GAP SERPL CALC-SCNC: 12 MMOL/L
AST SERPL-CCNC: 41 U/L
BASOPHILS # BLD AUTO: 0.03 K/UL
BASOPHILS NFR BLD AUTO: 0.6 %
BILIRUB SERPL-MCNC: 0.6 MG/DL
BUN SERPL-MCNC: 14 MG/DL
CALCIUM SERPL-MCNC: 10.1 MG/DL
CHLORIDE SERPL-SCNC: 101 MMOL/L
CHOLEST SERPL-MCNC: 203 MG/DL
CO2 SERPL-SCNC: 28 MMOL/L
CREAT SERPL-MCNC: 0.88 MG/DL
EGFR: 103 ML/MIN/1.73M2
EOSINOPHIL # BLD AUTO: 0.2 K/UL
EOSINOPHIL NFR BLD AUTO: 3.8 %
ESTIMATED AVERAGE GLUCOSE: 117 MG/DL
GLUCOSE SERPL-MCNC: 94 MG/DL
HBA1C MFR BLD HPLC: 5.7 %
HCT VFR BLD CALC: 49 %
HDLC SERPL-MCNC: 37 MG/DL
HGB BLD-MCNC: 16.3 G/DL
HIV1+2 AB SPEC QL IA.RAPID: NONREACTIVE
IMM GRANULOCYTES NFR BLD AUTO: 0.2 %
LDLC SERPL CALC-MCNC: 109 MG/DL
LYMPHOCYTES # BLD AUTO: 1.55 K/UL
LYMPHOCYTES NFR BLD AUTO: 29.3 %
MAN DIFF?: NORMAL
MCHC RBC-ENTMCNC: 31.6 PG
MCHC RBC-ENTMCNC: 33.3 GM/DL
MCV RBC AUTO: 95 FL
MONOCYTES # BLD AUTO: 0.5 K/UL
MONOCYTES NFR BLD AUTO: 9.5 %
NEUTROPHILS # BLD AUTO: 3 K/UL
NEUTROPHILS NFR BLD AUTO: 56.6 %
NONHDLC SERPL-MCNC: 165 MG/DL
PLATELET # BLD AUTO: 265 K/UL
POTASSIUM SERPL-SCNC: 4.6 MMOL/L
PROT SERPL-MCNC: 7.7 G/DL
PSA SERPL-MCNC: 1.04 NG/ML
RBC # BLD: 5.16 M/UL
RBC # FLD: 12.8 %
SODIUM SERPL-SCNC: 141 MMOL/L
TRIGL SERPL-MCNC: 282 MG/DL
TSH SERPL-ACNC: 3.68 UIU/ML
WBC # FLD AUTO: 5.29 K/UL

## 2022-08-14 LAB — HEMOCCULT STL QL IA: NEGATIVE

## 2022-08-26 ENCOUNTER — APPOINTMENT (OUTPATIENT)
Dept: INTERNAL MEDICINE | Facility: CLINIC | Age: 52
End: 2022-08-26

## 2022-08-26 VITALS
SYSTOLIC BLOOD PRESSURE: 148 MMHG | HEIGHT: 68 IN | HEART RATE: 92 BPM | TEMPERATURE: 96.7 F | OXYGEN SATURATION: 96 % | WEIGHT: 315 LBS | DIASTOLIC BLOOD PRESSURE: 88 MMHG | BODY MASS INDEX: 47.74 KG/M2

## 2022-08-26 PROCEDURE — 99215 OFFICE O/P EST HI 40 MIN: CPT | Mod: 25

## 2022-08-26 PROCEDURE — G0447 BEHAVIOR COUNSEL OBESITY 15M: CPT

## 2022-08-26 RX ORDER — TOBRAMYCIN AND DEXAMETHASONE 3; 1 MG/ML; MG/ML
0.3-0.1 SUSPENSION/ DROPS OPHTHALMIC
Qty: 1 | Refills: 3 | Status: ACTIVE | COMMUNITY
Start: 2022-08-26 | End: 1900-01-01

## 2022-08-26 NOTE — HISTORY OF PRESENT ILLNESS
[FreeTextEntry1] : Presents with concerns regarding anxiety, asthma, eczema, morbid obesity, ESME and hypertriglyceridemia.  [de-identified] : \par Medical Issue 1: Anxiety: unstable and worsening with difficulty controlling symptoms off medication; this was complicated by visit to mental health hospital since self-d/c'ing medication which led to suicidal thoughts; the thoughts have since resolved and he restarted care with his psychiatrist along with medication; he is looking for new psychologist and would like resources to help find one\par \par Medical Issue 2: Asthma: unstable and variably controlled with episodes of SOB occurring intermittently but usually associated with anxiety; he has inhalers which he uses as needed; he denies nighttime awakenings and chest pain; he follows with a pulmonologist as well\par \par Medical Issue 3: Eczema: unstable with persistently dry skin that is itchy and bothersome; he would like second opinion with new dermatologist and needs help with resources to set this up\par \par Medical Issue 4: Morbid obesity: unstable and worsening with increase in BMI and body weight; he has no diet and no exercise routine yet but he did join a gym and is motivated to start using it regularly; he denies chest pain with exertion and shortness of breath; he would like help with resources to achieve his weight loss goals\par \par Medical Issue 5: ESME: stable and controlled with CPAP machine; he lives alone so does not have anyone to tell him if he snores loudly or gasps for air overnight while sleeping; he reports no daytime somnolence and he wakes up feeling refreshed in the mornings\par \par Medical Issue 6: Hypertriglyceridemia: unstable and poorly controlled; not currently on medication; he is trying to modify his diet and work on exercise routine for weight loss; he denies chest pains, palpitations and shortness of breath

## 2022-08-26 NOTE — ASSESSMENT
[FreeTextEntry1] : \par Medical Issue 1: Anxiety: unstable and worsening with difficulty controlling symptoms off medication; this was complicated by visit to mental health hospital since self-d/c'ing medication which led to suicidal thoughts; the thoughts have since resolved and he restarted care with his psychiatrist along with medication; coordinated follow-up care with psych\par \par Medical Issue 2: Asthma: unstable and variably controlled with episodes of SOB occurring intermittently but usually associated with anxiety; prescribed inhalers and coordinated follow-up care with pulmonologist\par \par Medical Issue 3: Eczema: unstable with persistently dry skin that is itchy and bothersome; advised hydration with Aloe gel and alternating with steroid cream as anti-inflammatory agent; coordinated follow-up care with dermatology\[facility] \par Medical Issue 4: Morbid obesity counselling: 15 mins, assessed BMI at 50 and above now in morbidly obese range; advised weight loss, exercise routine and diet; pt agreed to start exercise program for target weight loss 20 lbs; assisted patient with resources including nutrition referral as needed and arranged for follow-up to monitor weight loss progress over next several months\par \par Medical Issue 5: ESME: stable and controlled with CPAP machine; continue current plan of care and coordinated follow-up care with sleep medicine team for titration of CPAP settings\par \par Medical Issue 6: Hypertriglyceridemia: unstable and poorly controlled; rec starting Gemfibrozil over next 3 mos if trigs remain elevated despite his lifestyle change efforts

## 2022-09-02 LAB
ALBUMIN SERPL ELPH-MCNC: 5 G/DL
ALP BLD-CCNC: 98 U/L
ALT SERPL-CCNC: 44 U/L
ANION GAP SERPL CALC-SCNC: 13 MMOL/L
AST SERPL-CCNC: 39 U/L
BILIRUB SERPL-MCNC: 0.5 MG/DL
BUN SERPL-MCNC: 13 MG/DL
CALCIUM SERPL-MCNC: 9.9 MG/DL
CHLORIDE SERPL-SCNC: 105 MMOL/L
CHOLEST SERPL-MCNC: 199 MG/DL
CO2 SERPL-SCNC: 24 MMOL/L
CREAT SERPL-MCNC: 0.84 MG/DL
EGFR: 104 ML/MIN/1.73M2
ESTIMATED AVERAGE GLUCOSE: 117 MG/DL
GLUCOSE SERPL-MCNC: 103 MG/DL
HBA1C MFR BLD HPLC: 5.7 %
HDLC SERPL-MCNC: 33 MG/DL
LDLC SERPL CALC-MCNC: 98 MG/DL
NONHDLC SERPL-MCNC: 166 MG/DL
POTASSIUM SERPL-SCNC: 4.4 MMOL/L
PROT SERPL-MCNC: 7.8 G/DL
SODIUM SERPL-SCNC: 142 MMOL/L
TRIGL SERPL-MCNC: 342 MG/DL
TSH SERPL-ACNC: 3.81 UIU/ML

## 2022-10-20 ENCOUNTER — RX RENEWAL (OUTPATIENT)
Age: 52
End: 2022-10-20

## 2022-10-20 RX ORDER — MOMETASONE FUROATE AND FORMOTEROL FUMARATE DIHYDRATE 100; 5 UG/1; UG/1
100-5 AEROSOL RESPIRATORY (INHALATION)
Qty: 39 | Refills: 3 | Status: ACTIVE | COMMUNITY
Start: 2022-10-20 | End: 1900-01-01

## 2022-11-04 ENCOUNTER — RX RENEWAL (OUTPATIENT)
Age: 52
End: 2022-11-04

## 2022-11-17 ENCOUNTER — APPOINTMENT (OUTPATIENT)
Dept: INTERNAL MEDICINE | Facility: CLINIC | Age: 52
End: 2022-11-17
Payer: COMMERCIAL

## 2022-11-17 VITALS
TEMPERATURE: 97.6 F | WEIGHT: 315 LBS | HEART RATE: 89 BPM | BODY MASS INDEX: 47.74 KG/M2 | DIASTOLIC BLOOD PRESSURE: 80 MMHG | OXYGEN SATURATION: 96 % | SYSTOLIC BLOOD PRESSURE: 150 MMHG | HEIGHT: 68 IN

## 2022-11-17 PROCEDURE — G0447 BEHAVIOR COUNSEL OBESITY 15M: CPT

## 2022-11-17 PROCEDURE — 99215 OFFICE O/P EST HI 40 MIN: CPT | Mod: 25

## 2022-11-17 NOTE — HISTORY OF PRESENT ILLNESS
[FreeTextEntry1] : Presents with concerns regarding anxiety, asthma, eczema, morbid obesity, ESME and hypertriglyceridemia.  [de-identified] : \par Medical Issue 1: Anxiety: unstable and worsening with difficulty controlling symptoms off medication; this was complicated by visit to mental health hospital since self-d/c'ing medication which led to suicidal thoughts; the thoughts have since resolved and he restarted care with his psychiatrist along with medication; he is looking for new psychologist and would like resources to help find one\par \par Medical Issue 2: Asthma: unstable and variably controlled with episodes of SOB occurring intermittently but usually associated with anxiety; he has inhalers which he uses as needed; he denies nighttime awakenings and chest pain; he follows with a pulmonologist as well\par \par Medical Issue 3: Eczema: unstable with persistently dry skin that is itchy and bothersome; he would like second opinion with new dermatologist and needs help with resources to set this up\par \par Medical Issue 4: Morbid obesity: unstable and worsening with increase in BMI and body weight; he has no diet and no exercise routine yet but he did join a gym and is motivated to start using it regularly; he denies chest pain with exertion and shortness of breath; he would like help with resources to achieve his weight loss goals\par \par Medical Issue 5: ESME: stable and controlled with CPAP machine; he lives alone so does not have anyone to tell him if he snores loudly or gasps for air overnight while sleeping; he reports no daytime somnolence and he wakes up feeling refreshed in the mornings\par \par Medical Issue 6: Hypertriglyceridemia: unstable and poorly controlled; not currently on medication; he is trying to modify his diet and work on exercise routine for weight loss; he denies chest pains, palpitations and shortness of breath

## 2022-11-17 NOTE — ASSESSMENT
[FreeTextEntry1] : \par Medical Issue 1: Anxiety: unstable and worsening with difficulty controlling symptoms off medication; this was complicated by visit to mental health hospital since self-d/c'ing medication which led to suicidal thoughts; the thoughts have since resolved and he restarted care with his psychiatrist along with medication; coordinated follow-up care with psych\par \par Medical Issue 2: Asthma: unstable and variably controlled with episodes of SOB occurring intermittently but usually associated with anxiety; prescribed inhalers and coordinated follow-up care with pulmonologist\par \par Medical Issue 3: Eczema: unstable with persistently dry skin that is itchy and bothersome; advised hydration with Aloe gel and alternating with steroid cream as anti-inflammatory agent; coordinated follow-up care with dermatology\[facility] \par Medical Issue 4: Morbid obesity counselling: 15 mins, assessed BMI at 50 and above now in morbidly obese range; advised weight loss, exercise routine and diet; pt agreed to start exercise program for target weight loss 20 lbs; assisted patient with resources including nutrition referral as needed and arranged for follow-up to monitor weight loss progress over next several months\par \par Medical Issue 5: ESME: stable and controlled with CPAP machine; continue current plan of care and coordinated follow-up care with sleep medicine team for titration of CPAP settings\par \par Medical Issue 6: Hypertriglyceridemia: unstable and poorly controlled; rec starting Gemfibrozil over next 3 mos if trigs remain elevated despite his lifestyle change efforts

## 2022-11-22 LAB
ALBUMIN SERPL ELPH-MCNC: 4.8 G/DL
ALP BLD-CCNC: 93 U/L
ALT SERPL-CCNC: 36 U/L
ANION GAP SERPL CALC-SCNC: 13 MMOL/L
AST SERPL-CCNC: 37 U/L
BASOPHILS # BLD AUTO: 0.06 K/UL
BASOPHILS NFR BLD AUTO: 1 %
BILIRUB SERPL-MCNC: 0.5 MG/DL
BUN SERPL-MCNC: 19 MG/DL
CALCIUM SERPL-MCNC: 9.7 MG/DL
CHLORIDE SERPL-SCNC: 103 MMOL/L
CHOLEST SERPL-MCNC: 209 MG/DL
CO2 SERPL-SCNC: 23 MMOL/L
CREAT SERPL-MCNC: 0.81 MG/DL
EGFR: 105 ML/MIN/1.73M2
EOSINOPHIL # BLD AUTO: 0.2 K/UL
EOSINOPHIL NFR BLD AUTO: 3.3 %
ESTIMATED AVERAGE GLUCOSE: 114 MG/DL
GLUCOSE SERPL-MCNC: 95 MG/DL
HBA1C MFR BLD HPLC: 5.6 %
HCT VFR BLD CALC: 49.3 %
HDLC SERPL-MCNC: 31 MG/DL
HGB BLD-MCNC: 15.9 G/DL
IMM GRANULOCYTES NFR BLD AUTO: 0.7 %
LDLC SERPL CALC-MCNC: NORMAL MG/DL
LYMPHOCYTES # BLD AUTO: 1.98 K/UL
LYMPHOCYTES NFR BLD AUTO: 32.7 %
MAN DIFF?: NORMAL
MCHC RBC-ENTMCNC: 31.4 PG
MCHC RBC-ENTMCNC: 32.3 GM/DL
MCV RBC AUTO: 97.4 FL
MONOCYTES # BLD AUTO: 0.56 K/UL
MONOCYTES NFR BLD AUTO: 9.3 %
NEUTROPHILS # BLD AUTO: 3.21 K/UL
NEUTROPHILS NFR BLD AUTO: 53 %
NONHDLC SERPL-MCNC: 178 MG/DL
PLATELET # BLD AUTO: 251 K/UL
POTASSIUM SERPL-SCNC: 4 MMOL/L
PROT SERPL-MCNC: 7.5 G/DL
RBC # BLD: 5.06 M/UL
RBC # FLD: 13.4 %
SODIUM SERPL-SCNC: 139 MMOL/L
TRIGL SERPL-MCNC: 414 MG/DL
TSH SERPL-ACNC: 2.46 UIU/ML
WBC # FLD AUTO: 6.05 K/UL

## 2023-03-02 ENCOUNTER — APPOINTMENT (OUTPATIENT)
Dept: INTERNAL MEDICINE | Facility: CLINIC | Age: 53
End: 2023-03-02

## 2023-07-20 ENCOUNTER — MED ADMIN CHARGE (OUTPATIENT)
Age: 53
End: 2023-07-20

## 2023-07-20 ENCOUNTER — APPOINTMENT (OUTPATIENT)
Dept: INTERNAL MEDICINE | Facility: CLINIC | Age: 53
End: 2023-07-20
Payer: COMMERCIAL

## 2023-07-20 VITALS
HEART RATE: 92 BPM | HEIGHT: 68 IN | WEIGHT: 315 LBS | DIASTOLIC BLOOD PRESSURE: 82 MMHG | BODY MASS INDEX: 47.74 KG/M2 | OXYGEN SATURATION: 97 % | SYSTOLIC BLOOD PRESSURE: 132 MMHG

## 2023-07-20 DIAGNOSIS — L30.9 DERMATITIS, UNSPECIFIED: ICD-10-CM

## 2023-07-20 DIAGNOSIS — E78.1 PURE HYPERGLYCERIDEMIA: ICD-10-CM

## 2023-07-20 DIAGNOSIS — Z00.00 ENCOUNTER FOR GENERAL ADULT MEDICAL EXAMINATION W/OUT ABNORMAL FINDINGS: ICD-10-CM

## 2023-07-20 DIAGNOSIS — G47.33 OBSTRUCTIVE SLEEP APNEA (ADULT) (PEDIATRIC): ICD-10-CM

## 2023-07-20 DIAGNOSIS — E11.9 TYPE 2 DIABETES MELLITUS W/OUT COMPLICATIONS: ICD-10-CM

## 2023-07-20 DIAGNOSIS — E78.00 PURE HYPERCHOLESTEROLEMIA, UNSPECIFIED: ICD-10-CM

## 2023-07-20 DIAGNOSIS — F41.9 ANXIETY DISORDER, UNSPECIFIED: ICD-10-CM

## 2023-07-20 PROCEDURE — 90715 TDAP VACCINE 7 YRS/> IM: CPT

## 2023-07-20 PROCEDURE — G0009: CPT

## 2023-07-20 PROCEDURE — 90472 IMMUNIZATION ADMIN EACH ADD: CPT

## 2023-07-20 PROCEDURE — 99396 PREV VISIT EST AGE 40-64: CPT | Mod: 25

## 2023-07-20 PROCEDURE — 90677 PCV20 VACCINE IM: CPT

## 2023-07-24 ENCOUNTER — TRANSCRIPTION ENCOUNTER (OUTPATIENT)
Age: 53
End: 2023-07-24

## 2023-07-24 PROBLEM — F41.9 ANXIETY: Status: ACTIVE | Noted: 2018-07-03

## 2023-07-24 PROBLEM — L30.9 ECZEMA: Status: ACTIVE | Noted: 2018-07-03

## 2023-07-24 LAB
25(OH)D3 SERPL-MCNC: 23.1 NG/ML
ALBUMIN SERPL ELPH-MCNC: 4.6 G/DL
ALP BLD-CCNC: 90 U/L
ALT SERPL-CCNC: 42 U/L
ANION GAP SERPL CALC-SCNC: 12 MMOL/L
AST SERPL-CCNC: 36 U/L
BILIRUB SERPL-MCNC: 0.4 MG/DL
BUN SERPL-MCNC: 16 MG/DL
CALCIUM SERPL-MCNC: 9.9 MG/DL
CHLORIDE SERPL-SCNC: 100 MMOL/L
CHOLEST SERPL-MCNC: 234 MG/DL
CO2 SERPL-SCNC: 29 MMOL/L
CREAT SERPL-MCNC: 0.95 MG/DL
EGFR: 95 ML/MIN/1.73M2
ESTIMATED AVERAGE GLUCOSE: 126 MG/DL
GLUCOSE SERPL-MCNC: 93 MG/DL
HBA1C MFR BLD HPLC: 6 %
HDLC SERPL-MCNC: 36 MG/DL
LDLC SERPL CALC-MCNC: 153 MG/DL
NONHDLC SERPL-MCNC: 198 MG/DL
POTASSIUM SERPL-SCNC: 4.4 MMOL/L
PROT SERPL-MCNC: 7.7 G/DL
PSA SERPL-MCNC: 1.11 NG/ML
SODIUM SERPL-SCNC: 142 MMOL/L
T4 FREE SERPL-MCNC: 1.2 NG/DL
TRIGL SERPL-MCNC: 246 MG/DL
TSH SERPL-ACNC: 2.29 UIU/ML

## 2023-07-24 NOTE — HISTORY OF PRESENT ILLNESS
[FreeTextEntry1] : CPE [de-identified] : WANDY SILVERMAN  is a 53 year old male  with history of  anxiety, asthma, eczema, morbid obesity and ESME.  presented today for comprehensive evaluation.\par \par Last seen by Dr. Crenshaw 11/17/22.\par \par He reports stable  condition. 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 He is a single and lives alone. No children.  Currently has no relationship. Has had sexual intercourse with women. No concern for STD, not active. \par \par About 3 weeks ago he started dry cough, headache, fatigue and visited [facility]. Treated with prednison 20mg 2 tablets for 5 days and benzonatate and feeling better. He got one time  COVID with mild sx only and no residual sx. \par \par He has had Panic attack after mother passed 10 years ago. f/up Psychiatrist Dr. hdz and getting therapy. Been  work and home for stress management and recently feeling stable. \par \par He asks for referral to Derm: Been picking skin of left elbow and elevated skin on the lesion bothers him. Hx hx of eczema. \par \par -Exercise: trying, walking. \par -diet: loves potato, cooks a lot. Cut down salt and uses pink Himalayan salt.  \par -sleep: ESME on CPAP, uses 100% times.\par Denied fever, chills,CP, SOB, abdominal pain, n/v/c/d.

## 2023-07-24 NOTE — HEALTH RISK ASSESSMENT
[Good] : ~his/her~  mood as  good [No] : In the past 12 months have you used drugs other than those required for medical reasons? No [No falls in past year] : Patient reported no falls in the past year [0] : 2) Feeling down, depressed, or hopeless: Not at all (0) [PHQ-2 Negative - No further assessment needed] : PHQ-2 Negative - No further assessment needed [None] : None [Alone] : lives alone [Employed] : employed [Single] : single [Feels Safe at Home] : Feels safe at home [Fully functional (bathing, dressing, toileting, transferring, walking, feeding)] : Fully functional (bathing, dressing, toileting, transferring, walking, feeding) [Fully functional (using the telephone, shopping, preparing meals, housekeeping, doing laundry, using] : Fully functional and needs no help or supervision to perform IADLs (using the telephone, shopping, preparing meals, housekeeping, doing laundry, using transportation, managing medications and managing finances) [Reports normal functional visual acuity (ie: able to read med bottle)] : Reports normal functional visual acuity [Smoke Detector] : smoke detector [Carbon Monoxide Detector] : carbon monoxide detector [Safety elements used in home] : safety elements used in home [Seat Belt] :  uses seat belt [Sunscreen] : uses sunscreen [Never] : Never [Yes] : Yes [Monthly or less (1 pt)] : Monthly or less (1 point) [1 or 2 (0 pts)] : 1 or 2 (0 points) [Never (0 pts)] : Never (0 points) [de-identified] : see hpi [Audit-CScore] : 1 [de-identified] : see hpi [de-identified] : see hpi [[id number]] : 0 [Change in mental status noted] : No change in mental status noted [Language] : denies difficulty with language [Behavior] : denies difficulty with behavior [Learning/Retaining New Information] : denies difficulty learning/retaining new information [Handling Complex Tasks] : denies difficulty handling complex tasks [Reasoning] : denies difficulty with reasoning [Spatial Ability and Orientation] : denies difficulty with spatial ability and orientation [Sexually Active] : not sexually active [High Risk Behavior] : no high risk behavior [Reports changes in hearing] : Reports no changes in hearing [Reports changes in vision] : Reports no changes in vision [Reports changes in dental health] : Reports no changes in dental health [Guns at Home] : no guns at home [Travel to Developing Areas] : does not  travel to developing areas [TB Exposure] : is not being exposed to tuberculosis [Caregiver Concerns] : does not have caregiver concerns

## 2023-07-24 NOTE — PHYSICAL EXAM
[Well Nourished] : well nourished [Well Developed] : well developed [Well-Appearing] : well-appearing [Normal Sclera/Conjunctiva] : normal sclera/conjunctiva [PERRL] : pupils equal round and reactive to light [EOMI] : extraocular movements intact [No JVD] : no jugular venous distention [No Lymphadenopathy] : no lymphadenopathy [Supple] : supple [No Respiratory Distress] : no respiratory distress  [No Accessory Muscle Use] : no accessory muscle use [Clear to Auscultation] : lungs were clear to auscultation bilaterally [Normal Rate] : normal rate  [Regular Rhythm] : with a regular rhythm [Normal S1, S2] : normal S1 and S2 [No Carotid Bruits] : no carotid bruits [No Murmur] : no murmur heard [No Abdominal Bruit] : a ~M bruit was not heard ~T in the abdomen [No Varicosities] : no varicosities [Pedal Pulses Present] : the pedal pulses are present [No Edema] : there was no peripheral edema [No Palpable Aorta] : no palpable aorta [No Extremity Clubbing/Cyanosis] : no extremity clubbing/cyanosis [Soft] : abdomen soft [Non Tender] : non-tender [Non-distended] : non-distended [No Masses] : no abdominal mass palpated [No HSM] : no HSM [Normal Bowel Sounds] : normal bowel sounds [No CVA Tenderness] : no CVA  tenderness [No Spinal Tenderness] : no spinal tenderness [No Joint Swelling] : no joint swelling [Grossly Normal Strength/Tone] : grossly normal strength/tone [No Rash] : no rash [Coordination Grossly Intact] : coordination grossly intact [No Focal Deficits] : no focal deficits [Normal Gait] : normal gait [Deep Tendon Reflexes (DTR)] : deep tendon reflexes were 2+ and symmetric [Normal Affect] : the affect was normal [Normal Insight/Judgement] : insight and judgment were intact [de-identified] : left elbow has elevated skin, not warm to touch, no itchiness. hx of eczema

## 2023-07-24 NOTE — ASSESSMENT
[FreeTextEntry1] : WANDY SILVERMAN  is a 53 year old male  with history of  anxiety, asthma, eczema, morbid obesity and ESME.  presented today for comprehensive evaluation.\par \par # HM\par -COVID shot: initial 2, and 3 booster shot. \par -Flu shot: UTD\par -Tdap: longer than 11 years ago. Amenable to get Tdap today.\par -Shingles: had chickenpox in childhood. Never had shingles or relevant vaccine. To get Shingrix next time with our nursing. \par -Pneumonia shot for Asthma: amenable to get Prevnar 20 today.\par -retina exam: will go back to Dr. Rocha in this year.wearing eye glasses\par -colonoscopy: got once remote past and normal. Gave FIT kit and made GI referral\par -PSA: check today.\par \par # anxiety\par f/up psych and therapist. \par PHQ2=0\par According to Dr. Crenshaw's note he had anxiety unstable and worsening with difficulty controlling symptoms off medication; this was complicated by visit to mental health hospital since self-d/c'ing medication which led to suicidal thoughts; the thoughts have since resolved and he restarted care with his psychiatrist along with medication; coordinated follow-up care with psych.\par Continue Cymbalta 90mg po qd, Duloxetine 30mg qd, Lorazepam 1mg po qd prn.\par \par # asthma\par Asthma: unstable and variably controlled with episodes of SOB occurring intermittently but usually associated with anxiety; prescribed inhalers and coordinated follow-up care with pulmonologist.\par Continue use albuterol prn, Dulera 100-5 2 puff bid, Flonase nasal inhaler. \par \par # Eczema\par Eczema: unstable with persistently dry skin that is itchy and bothersome; advised hydration with Aloe gel and alternating with steroid cream as anti-inflammatory agent; coordinated follow-up care with dermatology.\par Made referral for dermatology for left elbow skin's discomfort\par \par # ESME on CPAP\par stable and controlled with CPAP machine; continue current plan of care and coordinated follow-up care with sleep medicine team for titration of CPAP settings\par \par #hypertriglyceridemia\par Continue take fish oil 1000mg 2cap qd and Gemfibrozil 600mg po bid. \par check fasting lipid panel. \par \par #  Morbid obesity counselling\par BMI 55.8\par advised weight loss, exercise routine and diet; pt agreed to start exercise program for target weight loss 20 lbs; assisted patient with resources including nutrition referral as needed and arranged for follow-up to monitor weight loss progress over next several months\par \par -check lab as planned.\par -F/up in 1 year or prn.\par \par \par \par

## 2023-07-25 ENCOUNTER — NON-APPOINTMENT (OUTPATIENT)
Age: 53
End: 2023-07-25

## 2023-07-25 PROBLEM — E78.00 ELEVATED LDL CHOLESTEROL LEVEL: Status: ACTIVE | Noted: 2023-07-25

## 2023-08-01 ENCOUNTER — TRANSCRIPTION ENCOUNTER (OUTPATIENT)
Age: 53
End: 2023-08-01

## 2023-08-01 LAB — HEMOCCULT STL QL IA: NEGATIVE

## 2023-08-07 ENCOUNTER — TRANSCRIPTION ENCOUNTER (OUTPATIENT)
Age: 53
End: 2023-08-07

## 2023-08-28 ENCOUNTER — NON-APPOINTMENT (OUTPATIENT)
Age: 53
End: 2023-08-28

## 2023-08-28 ENCOUNTER — APPOINTMENT (OUTPATIENT)
Dept: OPHTHALMOLOGY | Facility: CLINIC | Age: 53
End: 2023-08-28
Payer: COMMERCIAL

## 2023-08-28 PROCEDURE — 92014 COMPRE OPH EXAM EST PT 1/>: CPT

## 2023-10-12 ENCOUNTER — OUTPATIENT (OUTPATIENT)
Dept: OUTPATIENT SERVICES | Facility: HOSPITAL | Age: 53
LOS: 1 days | End: 2023-10-12
Payer: COMMERCIAL

## 2023-10-12 ENCOUNTER — APPOINTMENT (OUTPATIENT)
Age: 53
End: 2023-10-12
Payer: COMMERCIAL

## 2023-10-12 DIAGNOSIS — Z01.818 ENCOUNTER FOR OTHER PREPROCEDURAL EXAMINATION: ICD-10-CM

## 2023-10-12 DIAGNOSIS — L91.8 OTHER HYPERTROPHIC DISORDERS OF THE SKIN: ICD-10-CM

## 2023-10-12 DIAGNOSIS — H10.33 UNSPECIFIED ACUTE CONJUNCTIVITIS, BILATERAL: ICD-10-CM

## 2023-10-12 DIAGNOSIS — Z92.29 PERSONAL HISTORY OF OTHER DRUG THERAPY: ICD-10-CM

## 2023-10-12 DIAGNOSIS — Z13.31 ENCOUNTER FOR SCREENING FOR DEPRESSION: ICD-10-CM

## 2023-10-12 DIAGNOSIS — Z13.39 ENCOUNTER FOR SCREENING EXAM FOR OTHER MENTAL HEALTH AND BEHAVIORAL DISORDERS: ICD-10-CM

## 2023-10-12 DIAGNOSIS — Z90.89 ACQUIRED ABSENCE OF OTHER ORGANS: Chronic | ICD-10-CM

## 2023-10-12 DIAGNOSIS — R19.5 OTHER FECAL ABNORMALITIES: ICD-10-CM

## 2023-10-12 DIAGNOSIS — Z12.11 ENCOUNTER FOR SCREENING FOR MALIGNANT NEOPLASM OF COLON: ICD-10-CM

## 2023-10-12 DIAGNOSIS — Z98.890 OTHER SPECIFIED POSTPROCEDURAL STATES: Chronic | ICD-10-CM

## 2023-10-12 DIAGNOSIS — I10 ESSENTIAL (PRIMARY) HYPERTENSION: ICD-10-CM

## 2023-10-12 PROCEDURE — G0008: CPT

## 2023-10-12 PROCEDURE — 90472 IMMUNIZATION ADMIN EACH ADD: CPT

## 2023-10-12 PROCEDURE — 90750 HZV VACC RECOMBINANT IM: CPT

## 2023-10-12 PROCEDURE — 99214 OFFICE O/P EST MOD 30 MIN: CPT | Mod: 25

## 2023-10-12 PROCEDURE — G0463: CPT | Mod: 25

## 2023-10-13 VITALS — DIASTOLIC BLOOD PRESSURE: 90 MMHG | RESPIRATION RATE: 14 BRPM | HEART RATE: 84 BPM | SYSTOLIC BLOOD PRESSURE: 132 MMHG

## 2023-10-13 PROBLEM — L91.8 SKIN TAG: Status: RESOLVED | Noted: 2023-07-20 | Resolved: 2023-10-13

## 2023-10-13 PROBLEM — H10.33 ACUTE BACTERIAL CONJUNCTIVITIS OF BOTH EYES: Status: RESOLVED | Noted: 2020-08-24 | Resolved: 2023-10-13

## 2023-10-13 PROBLEM — R19.5 POSITIVE OCCULT STOOL BLOOD TEST: Status: RESOLVED | Noted: 2019-07-29 | Resolved: 2023-10-13

## 2023-10-13 PROBLEM — Z01.818 PREOP EXAMINATION: Status: RESOLVED | Noted: 2019-08-06 | Resolved: 2023-10-13

## 2023-10-13 PROBLEM — Z92.29 HISTORY OF INFLUENZA VACCINATION: Status: RESOLVED | Noted: 2019-10-09 | Resolved: 2023-10-13

## 2023-10-13 PROBLEM — Z12.11 COLON CANCER SCREENING: Status: RESOLVED | Noted: 2019-07-29 | Resolved: 2023-10-13

## 2023-10-13 PROBLEM — Z92.29 HISTORY OF INFLUENZA VACCINATION: Status: RESOLVED | Noted: 2018-12-05 | Resolved: 2023-10-13

## 2023-10-13 PROBLEM — Z13.39 ALCOHOL SCREENING: Status: RESOLVED | Noted: 2019-07-11 | Resolved: 2023-10-13

## 2023-10-13 PROBLEM — Z13.31 DEPRESSION SCREENING: Status: RESOLVED | Noted: 2019-07-11 | Resolved: 2023-10-13

## 2023-10-16 DIAGNOSIS — Z23 ENCOUNTER FOR IMMUNIZATION: ICD-10-CM

## 2023-10-16 DIAGNOSIS — E66.01 MORBID (SEVERE) OBESITY DUE TO EXCESS CALORIES: ICD-10-CM

## 2023-11-03 ENCOUNTER — APPOINTMENT (OUTPATIENT)
Dept: INTERNAL MEDICINE | Facility: CLINIC | Age: 53
End: 2023-11-03
Payer: COMMERCIAL

## 2023-11-03 VITALS
WEIGHT: 315 LBS | HEIGHT: 68 IN | SYSTOLIC BLOOD PRESSURE: 128 MMHG | HEART RATE: 93 BPM | OXYGEN SATURATION: 93 % | BODY MASS INDEX: 47.74 KG/M2 | DIASTOLIC BLOOD PRESSURE: 80 MMHG

## 2023-11-03 DIAGNOSIS — R09.82 POSTNASAL DRIP: ICD-10-CM

## 2023-11-03 DIAGNOSIS — J45.901 UNSPECIFIED ASTHMA WITH (ACUTE) EXACERBATION: ICD-10-CM

## 2023-11-03 DIAGNOSIS — R06.09 OTHER FORMS OF DYSPNEA: ICD-10-CM

## 2023-11-03 DIAGNOSIS — J98.01 ACUTE BRONCHOSPASM: ICD-10-CM

## 2023-11-03 PROCEDURE — 99214 OFFICE O/P EST MOD 30 MIN: CPT

## 2023-11-03 RX ORDER — PREDNISONE 50 MG/1
50 TABLET ORAL
Qty: 5 | Refills: 0 | Status: COMPLETED | COMMUNITY
Start: 2023-11-03 | End: 2023-11-08

## 2023-11-03 RX ORDER — AZELASTINE HYDROCHLORIDE 205.5 UG/1
0.15 SPRAY, METERED NASAL
Qty: 1 | Refills: 3 | Status: ACTIVE | COMMUNITY
Start: 2023-11-03 | End: 1900-01-01

## 2023-11-03 RX ORDER — ALBUTEROL SULFATE 90 UG/1
108 (90 BASE) INHALANT RESPIRATORY (INHALATION)
Qty: 1 | Refills: 10 | Status: ACTIVE | COMMUNITY
Start: 2023-11-03 | End: 1900-01-01

## 2023-11-03 RX ORDER — ATORVASTATIN CALCIUM 10 MG/1
10 TABLET, FILM COATED ORAL
Qty: 90 | Refills: 3 | Status: ACTIVE | COMMUNITY
Start: 2023-07-25 | End: 1900-01-01

## 2023-11-03 RX ORDER — INHALER, ASSIST DEVICES
SPACER (EA) MISCELLANEOUS
Qty: 1 | Refills: 2 | Status: ACTIVE | COMMUNITY
Start: 2023-11-03 | End: 1900-01-01

## 2023-11-03 RX ORDER — FLUTICASONE PROPIONATE 50 UG/1
50 SPRAY, METERED NASAL TWICE DAILY
Qty: 1 | Refills: 10 | Status: ACTIVE | COMMUNITY
Start: 2023-11-03 | End: 1900-01-01

## 2023-11-30 ENCOUNTER — APPOINTMENT (OUTPATIENT)
Dept: INTERNAL MEDICINE | Facility: CLINIC | Age: 53
End: 2023-11-30
Payer: COMMERCIAL

## 2023-11-30 ENCOUNTER — OUTPATIENT (OUTPATIENT)
Dept: OUTPATIENT SERVICES | Facility: HOSPITAL | Age: 53
LOS: 1 days | End: 2023-11-30
Payer: COMMERCIAL

## 2023-11-30 DIAGNOSIS — J45.909 UNSPECIFIED ASTHMA, UNCOMPLICATED: ICD-10-CM

## 2023-11-30 DIAGNOSIS — Z98.890 OTHER SPECIFIED POSTPROCEDURAL STATES: Chronic | ICD-10-CM

## 2023-11-30 DIAGNOSIS — Z90.89 ACQUIRED ABSENCE OF OTHER ORGANS: Chronic | ICD-10-CM

## 2023-11-30 PROCEDURE — G0463: CPT

## 2023-11-30 PROCEDURE — 99213 OFFICE O/P EST LOW 20 MIN: CPT

## 2023-11-30 RX ORDER — NEBULIZER ACCESSORIES
KIT MISCELLANEOUS
Qty: 3 | Refills: 5 | Status: ACTIVE | COMMUNITY
Start: 2023-11-30 | End: 1900-01-01

## 2023-11-30 RX ORDER — ALBUTEROL SULFATE 2.5 MG/3ML
(2.5 MG/3ML) SOLUTION RESPIRATORY (INHALATION) EVERY 6 HOURS
Qty: 1 | Refills: 5 | Status: ACTIVE | COMMUNITY
Start: 2023-11-30 | End: 1900-01-01

## 2023-12-01 DIAGNOSIS — I10 ESSENTIAL (PRIMARY) HYPERTENSION: ICD-10-CM

## 2023-12-14 DIAGNOSIS — J45.909 UNSPECIFIED ASTHMA, UNCOMPLICATED: ICD-10-CM

## 2023-12-26 ENCOUNTER — APPOINTMENT (OUTPATIENT)
Dept: DERMATOLOGY | Facility: CLINIC | Age: 53
End: 2023-12-26
Payer: COMMERCIAL

## 2023-12-26 DIAGNOSIS — L81.0 POSTINFLAMMATORY HYPERPIGMENTATION: ICD-10-CM

## 2023-12-26 DIAGNOSIS — Q82.8 OTHER SPECIFIED CONGENITAL MALFORMATIONS OF SKIN: ICD-10-CM

## 2023-12-26 PROCEDURE — 99204 OFFICE O/P NEW MOD 45 MIN: CPT

## 2023-12-26 NOTE — HISTORY OF PRESENT ILLNESS
[FreeTextEntry1] : scab on elbow, skin tags, blemishes on the leg; hair thinning [de-identified] : 53M here for  1) scab on the L elbow x months. Does endorse chronic manipulation. No itch 2) skin tags under the arms 3) blemishes on the LEs due to prior bug bites. Present x years 4) hair thinning

## 2023-12-26 NOTE — PHYSICAL EXAM
[Alert] : alert [Oriented x 3] : ~L oriented x 3 [Well Nourished] : well nourished [Conjunctiva Non-injected] : conjunctiva non-injected [No Visual Lymphadenopathy] : no visual  lymphadenopathy [No Clubbing] : no clubbing [No Edema] : no edema [No Bromhidrosis] : no bromhidrosis [No Chromhidrosis] : no chromhidrosis [FreeTextEntry3] : thinning and recession of hte frontal hairline L elbow with lichenified plaque Hyperpigmented patches on the LEs Pedunculated papules in the axilla

## 2024-01-18 ENCOUNTER — OUTPATIENT (OUTPATIENT)
Dept: OUTPATIENT SERVICES | Facility: HOSPITAL | Age: 54
LOS: 1 days | End: 2024-01-18
Payer: COMMERCIAL

## 2024-01-18 ENCOUNTER — APPOINTMENT (OUTPATIENT)
Dept: INTERNAL MEDICINE | Facility: CLINIC | Age: 54
End: 2024-01-18
Payer: COMMERCIAL

## 2024-01-18 DIAGNOSIS — Z90.89 ACQUIRED ABSENCE OF OTHER ORGANS: Chronic | ICD-10-CM

## 2024-01-18 DIAGNOSIS — I10 ESSENTIAL (PRIMARY) HYPERTENSION: ICD-10-CM

## 2024-01-18 DIAGNOSIS — Z98.890 OTHER SPECIFIED POSTPROCEDURAL STATES: Chronic | ICD-10-CM

## 2024-01-18 PROCEDURE — 99213 OFFICE O/P EST LOW 20 MIN: CPT

## 2024-01-18 PROCEDURE — G2211 COMPLEX E/M VISIT ADD ON: CPT

## 2024-01-18 PROCEDURE — G0463: CPT

## 2024-01-18 RX ORDER — LEVOFLOXACIN 500 MG/1
500 TABLET, FILM COATED ORAL DAILY
Qty: 10 | Refills: 0 | Status: DISCONTINUED | COMMUNITY
Start: 2023-11-30 | End: 2024-01-18

## 2024-01-18 RX ORDER — PREDNISONE 20 MG/1
20 TABLET ORAL DAILY
Qty: 18 | Refills: 0 | Status: DISCONTINUED | COMMUNITY
Start: 2023-11-30 | End: 2024-01-18

## 2024-01-18 RX ORDER — DULOXETINE HYDROCHLORIDE 30 MG/1
30 CAPSULE, DELAYED RELEASE PELLETS ORAL DAILY
Qty: 270 | Refills: 3 | Status: ACTIVE | COMMUNITY
Start: 2022-02-15 | End: 1900-01-01

## 2024-01-19 VITALS — DIASTOLIC BLOOD PRESSURE: 76 MMHG | SYSTOLIC BLOOD PRESSURE: 120 MMHG

## 2024-01-19 NOTE — HISTORY OF PRESENT ILLNESS
[de-identified] : Asthma in remission.  RTC for follow up on Ozempic. Has taken two doses with no side effects.  No demonstrable weight loss.

## 2024-01-25 DIAGNOSIS — E66.01 MORBID (SEVERE) OBESITY DUE TO EXCESS CALORIES: ICD-10-CM

## 2024-03-07 RX ORDER — GEMFIBROZIL 600 MG/1
600 TABLET, FILM COATED ORAL
Qty: 180 | Refills: 3 | Status: ACTIVE | COMMUNITY
Start: 2019-10-15 | End: 1900-01-01

## 2024-03-12 RX ORDER — ALBUTEROL SULFATE 90 UG/1
108 (90 BASE) INHALANT RESPIRATORY (INHALATION)
Qty: 3 | Refills: 3 | Status: ACTIVE | COMMUNITY
Start: 2019-09-17 | End: 1900-01-01

## 2024-04-02 ENCOUNTER — APPOINTMENT (OUTPATIENT)
Dept: DERMATOLOGY | Facility: CLINIC | Age: 54
End: 2024-04-02
Payer: COMMERCIAL

## 2024-04-02 DIAGNOSIS — L64.9 ANDROGENIC ALOPECIA, UNSPECIFIED: ICD-10-CM

## 2024-04-02 DIAGNOSIS — H01.9 UNSPECIFIED INFLAMMATION OF EYELID: ICD-10-CM

## 2024-04-02 DIAGNOSIS — B35.1 TINEA UNGUIUM: ICD-10-CM

## 2024-04-02 DIAGNOSIS — L85.8 OTHER SPECIFIED EPIDERMAL THICKENING: ICD-10-CM

## 2024-04-02 DIAGNOSIS — L28.0 LICHEN SIMPLEX CHRONICUS: ICD-10-CM

## 2024-04-02 PROCEDURE — 99214 OFFICE O/P EST MOD 30 MIN: CPT

## 2024-04-02 RX ORDER — CICLOPIROX 80 MG/ML
8 SOLUTION TOPICAL
Qty: 1 | Refills: 11 | Status: ACTIVE | COMMUNITY
Start: 2024-04-02 | End: 1900-01-01

## 2024-04-02 RX ORDER — MINOXIDIL 2.5 MG/1
2.5 TABLET ORAL DAILY
Qty: 90 | Refills: 1 | Status: ACTIVE | COMMUNITY
Start: 2023-12-26 | End: 1900-01-01

## 2024-04-04 ENCOUNTER — OUTPATIENT (OUTPATIENT)
Dept: OUTPATIENT SERVICES | Facility: HOSPITAL | Age: 54
LOS: 1 days | End: 2024-04-04
Payer: COMMERCIAL

## 2024-04-04 ENCOUNTER — APPOINTMENT (OUTPATIENT)
Dept: INTERNAL MEDICINE | Facility: CLINIC | Age: 54
End: 2024-04-04
Payer: COMMERCIAL

## 2024-04-04 DIAGNOSIS — E66.01 MORBID (SEVERE) OBESITY DUE TO EXCESS CALORIES: ICD-10-CM

## 2024-04-04 DIAGNOSIS — Z90.89 ACQUIRED ABSENCE OF OTHER ORGANS: Chronic | ICD-10-CM

## 2024-04-04 DIAGNOSIS — Z98.890 OTHER SPECIFIED POSTPROCEDURAL STATES: Chronic | ICD-10-CM

## 2024-04-04 DIAGNOSIS — I10 ESSENTIAL (PRIMARY) HYPERTENSION: ICD-10-CM

## 2024-04-04 PROCEDURE — G0463: CPT

## 2024-04-04 PROCEDURE — 99213 OFFICE O/P EST LOW 20 MIN: CPT

## 2024-04-05 VITALS
HEART RATE: 80 BPM | BODY MASS INDEX: 51.7 KG/M2 | DIASTOLIC BLOOD PRESSURE: 70 MMHG | SYSTOLIC BLOOD PRESSURE: 110 MMHG | RESPIRATION RATE: 14 BRPM | WEIGHT: 315 LBS

## 2024-04-05 PROBLEM — E66.01 MORBID OBESITY WITH BMI OF 50.0-59.9, ADULT: Status: ACTIVE | Noted: 2019-07-10

## 2024-04-05 NOTE — HISTORY OF PRESENT ILLNESS
[de-identified] : RTC to assess weight loss with use of Ozempic.  Has felt he has made tremendous progress.  Lost several clothing sizes.  No side effects.   Would like to escalate dose.

## 2024-04-08 DIAGNOSIS — E66.01 MORBID (SEVERE) OBESITY DUE TO EXCESS CALORIES: ICD-10-CM

## 2024-04-10 RX ORDER — TACROLIMUS 1 MG/G
0.1 OINTMENT TOPICAL
Qty: 1 | Refills: 1 | Status: ACTIVE | COMMUNITY
Start: 2024-04-02

## 2024-04-10 RX ORDER — CICLOPIROX 8 %
8 KIT TOPICAL
Qty: 1 | Refills: 0 | Status: ACTIVE | COMMUNITY
Start: 2024-04-10 | End: 1900-01-01

## 2024-05-10 DIAGNOSIS — A08.4 VIRAL INTESTINAL INFECTION, UNSPECIFIED: ICD-10-CM

## 2024-05-21 DIAGNOSIS — R11.0 NAUSEA: ICD-10-CM

## 2024-05-21 RX ORDER — ONDANSETRON 8 MG/1
8 TABLET, ORALLY DISINTEGRATING ORAL EVERY 6 HOURS
Qty: 60 | Refills: 0 | Status: ACTIVE | COMMUNITY
Start: 2024-05-21 | End: 1900-01-01

## 2024-05-21 RX ORDER — SEMAGLUTIDE 0.68 MG/ML
2 INJECTION, SOLUTION SUBCUTANEOUS
Qty: 4 | Refills: 5 | Status: ACTIVE | COMMUNITY
Start: 2023-10-12 | End: 1900-01-01

## 2024-05-23 RX ORDER — ONDANSETRON 4 MG/1
4 TABLET ORAL 3 TIMES DAILY
Qty: 9 | Refills: 3 | Status: ACTIVE | COMMUNITY
Start: 2024-05-10 | End: 1900-01-01

## 2024-05-28 RX ORDER — DIPHENOXYLATE HYDROCHLORIDE AND ATROPINE SULFATE 2.5; .025 MG/1; MG/1
2.5-0.025 TABLET ORAL 4 TIMES DAILY
Qty: 24 | Refills: 3 | Status: ACTIVE | COMMUNITY
Start: 2024-05-10 | End: 1900-01-01

## 2024-07-22 PROBLEM — R11.0 DAILY NAUSEA: Status: RESOLVED | Noted: 2024-05-21 | Resolved: 2024-07-22

## 2024-07-24 ENCOUNTER — APPOINTMENT (OUTPATIENT)
Dept: INTERNAL MEDICINE | Facility: CLINIC | Age: 54
End: 2024-07-24
Payer: COMMERCIAL

## 2024-07-24 ENCOUNTER — LABORATORY RESULT (OUTPATIENT)
Age: 54
End: 2024-07-24

## 2024-07-24 DIAGNOSIS — R11.0 NAUSEA: ICD-10-CM

## 2024-07-24 DIAGNOSIS — Z11.3 ENCOUNTER FOR SCREENING FOR INFECTIONS WITH A PREDOMINANTLY SEXUAL MODE OF TRANSMISSION: ICD-10-CM

## 2024-07-24 DIAGNOSIS — Z00.00 ENCOUNTER FOR GENERAL ADULT MEDICAL EXAMINATION W/OUT ABNORMAL FINDINGS: ICD-10-CM

## 2024-07-24 PROCEDURE — 99396 PREV VISIT EST AGE 40-64: CPT

## 2024-07-24 RX ORDER — DOXYCYCLINE HYCLATE 100 MG/1
100 CAPSULE ORAL
Qty: 14 | Refills: 0 | Status: ACTIVE | COMMUNITY
Start: 2024-07-24 | End: 1900-01-01

## 2024-07-24 RX ADMIN — CEFTRIAXONE MG: 250 INJECTION, POWDER, FOR SOLUTION INTRAMUSCULAR; INTRAVENOUS at 00:00

## 2024-07-25 VITALS — SYSTOLIC BLOOD PRESSURE: 128 MMHG | DIASTOLIC BLOOD PRESSURE: 68 MMHG | HEART RATE: 78 BPM | RESPIRATION RATE: 14 BRPM

## 2024-07-25 LAB
25(OH)D3 SERPL-MCNC: 24.2 NG/ML
ALBUMIN SERPL ELPH-MCNC: 4.6 G/DL
ALP BLD-CCNC: 103 U/L
ALT SERPL-CCNC: 39 U/L
ANION GAP SERPL CALC-SCNC: 16 MMOL/L
AST SERPL-CCNC: 35 U/L
BASOPHILS # BLD AUTO: 0.07 K/UL
BASOPHILS NFR BLD AUTO: 0.9 %
BILIRUB SERPL-MCNC: 0.4 MG/DL
BUN SERPL-MCNC: 17 MG/DL
CALCIUM SERPL-MCNC: 9.6 MG/DL
CHLORIDE SERPL-SCNC: 103 MMOL/L
CHOLEST SERPL-MCNC: 163 MG/DL
CO2 SERPL-SCNC: 24 MMOL/L
CREAT SERPL-MCNC: 0.85 MG/DL
EGFR: 103 ML/MIN/1.73M2
EOSINOPHIL # BLD AUTO: 0.25 K/UL
EOSINOPHIL NFR BLD AUTO: 3.1 %
ESTIMATED AVERAGE GLUCOSE: 111 MG/DL
GLUCOSE SERPL-MCNC: 87 MG/DL
HBA1C MFR BLD HPLC: 5.5 %
HCT VFR BLD CALC: 46.6 %
HDLC SERPL-MCNC: 29 MG/DL
HGB BLD-MCNC: 15.8 G/DL
IMM GRANULOCYTES NFR BLD AUTO: 1.1 %
LDLC SERPL CALC-MCNC: 53 MG/DL
LYMPHOCYTES # BLD AUTO: 2.47 K/UL
LYMPHOCYTES NFR BLD AUTO: 31.1 %
MAN DIFF?: NORMAL
MCHC RBC-ENTMCNC: 32.7 PG
MCHC RBC-ENTMCNC: 33.9 GM/DL
MCV RBC AUTO: 96.5 FL
MONOCYTES # BLD AUTO: 0.64 K/UL
MONOCYTES NFR BLD AUTO: 8.1 %
NEUTROPHILS # BLD AUTO: 4.43 K/UL
NEUTROPHILS NFR BLD AUTO: 55.7 %
NONHDLC SERPL-MCNC: 134 MG/DL
PLATELET # BLD AUTO: 220 K/UL
POTASSIUM SERPL-SCNC: 3.9 MMOL/L
PROT SERPL-MCNC: 7.6 G/DL
PSA SERPL-MCNC: 0.98 NG/ML
RBC # BLD: 4.83 M/UL
RBC # FLD: 13.7 %
SODIUM SERPL-SCNC: 143 MMOL/L
TRIGL SERPL-MCNC: 549 MG/DL
WBC # FLD AUTO: 7.95 K/UL

## 2024-07-25 NOTE — HEALTH RISK ASSESSMENT
[Excellent] : ~his/her~  mood as  excellent [Yes] : Yes [Monthly or less (1 pt)] : Monthly or less (1 point) [1 or 2 (0 pts)] : 1 or 2 (0 points) [Never (0 pts)] : Never (0 points) [No] : In the past 12 months have you used drugs other than those required for medical reasons? No [No falls in past year] : Patient reported no falls in the past year [0] : 2) Feeling down, depressed, or hopeless: Not at all (0) [PHQ-2 Negative - No further assessment needed] : PHQ-2 Negative - No further assessment needed [Never] : Never [None] : None [Alone] : lives alone [Employed] : employed [Single] : single [Fully functional (bathing, dressing, toileting, transferring, walking, feeding)] : Fully functional (bathing, dressing, toileting, transferring, walking, feeding) [Fully functional (using the telephone, shopping, preparing meals, housekeeping, doing laundry, using] : Fully functional and needs no help or supervision to perform IADLs (using the telephone, shopping, preparing meals, housekeeping, doing laundry, using transportation, managing medications and managing finances) [Reports normal functional visual acuity (ie: able to read med bottle)] : Reports normal functional visual acuity [Smoke Detector] : smoke detector [Seat Belt] :  uses seat belt [Audit-CScore] : 1 [[id number]] : 0 [Change in mental status noted] : No change in mental status noted [Language] : denies difficulty with language [Behavior] : denies difficulty with behavior [Learning/Retaining New Information] : denies difficulty learning/retaining new information [Handling Complex Tasks] : denies difficulty handling complex tasks [Reasoning] : denies difficulty with reasoning [Spatial Ability and Orientation] : denies difficulty with spatial ability and orientation [Reports changes in hearing] : Reports no changes in hearing [Reports changes in vision] : Reports no changes in vision [Reports changes in dental health] : Reports no changes in dental health

## 2024-07-25 NOTE — HISTORY OF PRESENT ILLNESS
[FreeTextEntry1] : Mr. SILVERMAN is here for an annual physical examination and assessment. [de-identified] : He generally feels well with no specific complaints. His recent health has been good. Restating ozempic at 0.5 mg after having episodes of diarrhea at 1 mg  Denies headache, dizziness. Denies rash, fatigue, fever, weight loss, anorexia. Denies cough, wheezing. Denies CP, SOB, HOOVER, edema, palpitations. Denies abdominal pain, N/V/D/C. Denies BRBPR, melena, dysphagia. Denies dysuria, frequency, hematuria, hesitancy. Denies weakness, numbness, gait instability.

## 2024-07-25 NOTE — HISTORY OF PRESENT ILLNESS
[FreeTextEntry1] : Mr. SILVERMAN is here for an annual physical examination and assessment. [de-identified] : He generally feels well with no specific complaints. His recent health has been good. Restating ozempic at 0.5 mg after having episodes of diarrhea at 1 mg  Denies headache, dizziness. Denies rash, fatigue, fever, weight loss, anorexia. Denies cough, wheezing. Denies CP, SOB, HOOVER, edema, palpitations. Denies abdominal pain, N/V/D/C. Denies BRBPR, melena, dysphagia. Denies dysuria, frequency, hematuria, hesitancy. Denies weakness, numbness, gait instability.

## 2024-07-25 NOTE — HEALTH RISK ASSESSMENT
[Excellent] : ~his/her~  mood as  excellent [Yes] : Yes [Monthly or less (1 pt)] : Monthly or less (1 point) [1 or 2 (0 pts)] : 1 or 2 (0 points) [Never (0 pts)] : Never (0 points) [No] : In the past 12 months have you used drugs other than those required for medical reasons? No [No falls in past year] : Patient reported no falls in the past year [0] : 2) Feeling down, depressed, or hopeless: Not at all (0) [PHQ-2 Negative - No further assessment needed] : PHQ-2 Negative - No further assessment needed [Never] : Never [None] : None [Alone] : lives alone [Employed] : employed [Single] : single [Fully functional (bathing, dressing, toileting, transferring, walking, feeding)] : Fully functional (bathing, dressing, toileting, transferring, walking, feeding) [Fully functional (using the telephone, shopping, preparing meals, housekeeping, doing laundry, using] : Fully functional and needs no help or supervision to perform IADLs (using the telephone, shopping, preparing meals, housekeeping, doing laundry, using transportation, managing medications and managing finances) [Reports normal functional visual acuity (ie: able to read med bottle)] : Reports normal functional visual acuity [Smoke Detector] : smoke detector [Seat Belt] :  uses seat belt [Audit-CScore] : 1 [[id number]] : 0 [Change in mental status noted] : No change in mental status noted [Language] : denies difficulty with language [Behavior] : denies difficulty with behavior [Learning/Retaining New Information] : denies difficulty learning/retaining new information [Handling Complex Tasks] : denies difficulty handling complex tasks [Reasoning] : denies difficulty with reasoning [Spatial Ability and Orientation] : denies difficulty with spatial ability and orientation [Reports changes in hearing] : Reports no changes in hearing [Reports changes in vision] : Reports no changes in vision [Reports changes in dental health] : Reports no changes in dental health

## 2024-07-26 LAB — HIV1+2 AB SPEC QL IA.RAPID: NONREACTIVE

## 2024-07-27 LAB — T PALLIDUM AB SER QL IA: NEGATIVE

## 2024-07-29 LAB
HAV IGM SER QL: NONREACTIVE
HBV SURFACE AG SER QL: NONREACTIVE
HCV AB SER QL: NONREACTIVE
HCV S/CO RATIO: 0.14 S/CO

## 2024-08-03 LAB — HEMOCCULT STL QL IA: NEGATIVE

## 2024-10-07 DIAGNOSIS — L30.9 DERMATITIS, UNSPECIFIED: ICD-10-CM

## 2024-10-08 ENCOUNTER — APPOINTMENT (OUTPATIENT)
Dept: DERMATOLOGY | Facility: CLINIC | Age: 54
End: 2024-10-08
Payer: COMMERCIAL

## 2024-10-08 VITALS — HEIGHT: 69 IN | WEIGHT: 315 LBS | BODY MASS INDEX: 46.65 KG/M2

## 2024-10-08 DIAGNOSIS — L64.9 ANDROGENIC ALOPECIA, UNSPECIFIED: ICD-10-CM

## 2024-10-08 DIAGNOSIS — L28.0 LICHEN SIMPLEX CHRONICUS: ICD-10-CM

## 2024-10-08 PROCEDURE — 99213 OFFICE O/P EST LOW 20 MIN: CPT

## 2024-10-10 ENCOUNTER — RX RENEWAL (OUTPATIENT)
Age: 54
End: 2024-10-10

## 2024-10-24 DIAGNOSIS — E11.9 TYPE 2 DIABETES MELLITUS W/OUT COMPLICATIONS: ICD-10-CM

## 2024-10-24 DIAGNOSIS — M54.30 SCIATICA, UNSPECIFIED SIDE: ICD-10-CM

## 2024-10-24 RX ORDER — ORAL SEMAGLUTIDE 14 MG/1
14 TABLET ORAL
Qty: 90 | Refills: 3 | Status: ACTIVE | COMMUNITY
Start: 2024-10-24 | End: 1900-01-01

## 2024-10-24 RX ORDER — TIZANIDINE 4 MG/1
4 TABLET ORAL
Qty: 45 | Refills: 3 | Status: ACTIVE | COMMUNITY
Start: 2024-10-24 | End: 1900-01-01

## 2025-01-13 ENCOUNTER — RX RENEWAL (OUTPATIENT)
Age: 55
End: 2025-01-13

## 2025-06-10 ENCOUNTER — RX RENEWAL (OUTPATIENT)
Age: 55
End: 2025-06-10

## 2025-08-19 ENCOUNTER — NON-APPOINTMENT (OUTPATIENT)
Age: 55
End: 2025-08-19

## 2025-08-19 ENCOUNTER — APPOINTMENT (OUTPATIENT)
Dept: OPHTHALMOLOGY | Facility: CLINIC | Age: 55
End: 2025-08-19
Payer: COMMERCIAL

## 2025-08-19 PROCEDURE — 92014 COMPRE OPH EXAM EST PT 1/>: CPT

## 2025-08-29 ENCOUNTER — OUTPATIENT (OUTPATIENT)
Dept: OUTPATIENT SERVICES | Facility: HOSPITAL | Age: 55
LOS: 1 days | End: 2025-08-29
Payer: COMMERCIAL

## 2025-08-29 ENCOUNTER — APPOINTMENT (OUTPATIENT)
Dept: INTERNAL MEDICINE | Facility: CLINIC | Age: 55
End: 2025-08-29
Payer: COMMERCIAL

## 2025-08-29 DIAGNOSIS — Z98.890 OTHER SPECIFIED POSTPROCEDURAL STATES: Chronic | ICD-10-CM

## 2025-08-29 DIAGNOSIS — Z00.00 ENCOUNTER FOR GENERAL ADULT MEDICAL EXAMINATION W/OUT ABNORMAL FINDINGS: ICD-10-CM

## 2025-08-29 DIAGNOSIS — Z23 ENCOUNTER FOR IMMUNIZATION: ICD-10-CM

## 2025-08-29 DIAGNOSIS — I10 ESSENTIAL (PRIMARY) HYPERTENSION: ICD-10-CM

## 2025-08-29 DIAGNOSIS — Z90.89 ACQUIRED ABSENCE OF OTHER ORGANS: Chronic | ICD-10-CM

## 2025-08-29 PROCEDURE — 99396 PREV VISIT EST AGE 40-64: CPT

## 2025-08-29 PROCEDURE — G0009: CPT

## 2025-08-29 PROCEDURE — 90656 IIV3 VACC NO PRSV 0.5 ML IM: CPT

## 2025-08-29 PROCEDURE — G0463: CPT

## 2025-08-29 RX ORDER — TIRZEPATIDE 2.5 MG/.5ML
2.5 INJECTION, SOLUTION SUBCUTANEOUS
Qty: 3 | Refills: 3 | Status: ACTIVE | COMMUNITY
Start: 2025-08-29 | End: 1900-01-01

## 2025-09-01 VITALS — HEART RATE: 76 BPM | SYSTOLIC BLOOD PRESSURE: 122 MMHG | RESPIRATION RATE: 14 BRPM | DIASTOLIC BLOOD PRESSURE: 74 MMHG

## 2025-09-08 DIAGNOSIS — Z00.00 ENCOUNTER FOR GENERAL ADULT MEDICAL EXAMINATION WITHOUT ABNORMAL FINDINGS: ICD-10-CM

## 2025-09-08 DIAGNOSIS — Z23 ENCOUNTER FOR IMMUNIZATION: ICD-10-CM
